# Patient Record
Sex: FEMALE | Race: ASIAN | NOT HISPANIC OR LATINO | Employment: OTHER | URBAN - METROPOLITAN AREA
[De-identification: names, ages, dates, MRNs, and addresses within clinical notes are randomized per-mention and may not be internally consistent; named-entity substitution may affect disease eponyms.]

---

## 2017-02-03 ENCOUNTER — OFFICE VISIT (OUTPATIENT)
Dept: MEDICAL GROUP | Facility: CLINIC | Age: 77
End: 2017-02-03
Payer: MEDICARE

## 2017-02-03 VITALS
HEART RATE: 90 BPM | BODY MASS INDEX: 23.39 KG/M2 | SYSTOLIC BLOOD PRESSURE: 128 MMHG | HEIGHT: 59 IN | WEIGHT: 116 LBS | DIASTOLIC BLOOD PRESSURE: 62 MMHG | TEMPERATURE: 97.9 F | OXYGEN SATURATION: 98 %

## 2017-02-03 DIAGNOSIS — R94.30 NONSPECIFIC ABNORMAL RESULTS OF CARDIOVASCULAR FUNCTION STUDY: Chronic | ICD-10-CM

## 2017-02-03 DIAGNOSIS — R94.31 ABNORMAL ELECTROCARDIOGRAM: Chronic | ICD-10-CM

## 2017-02-03 DIAGNOSIS — I10 ESSENTIAL HYPERTENSION: Chronic | ICD-10-CM

## 2017-02-03 DIAGNOSIS — R01.1 MURMUR: Chronic | ICD-10-CM

## 2017-02-03 DIAGNOSIS — M79.672 PAIN IN BOTH FEET: Chronic | ICD-10-CM

## 2017-02-03 DIAGNOSIS — Z82.49 FAMILY HISTORY OF CORONARY ARTERY DISEASE: ICD-10-CM

## 2017-02-03 DIAGNOSIS — Z00.00 MEDICARE ANNUAL WELLNESS VISIT, INITIAL: Primary | ICD-10-CM

## 2017-02-03 DIAGNOSIS — Z85.3 HISTORY OF BREAST CANCER: Chronic | ICD-10-CM

## 2017-02-03 DIAGNOSIS — M79.671 PAIN IN BOTH FEET: Chronic | ICD-10-CM

## 2017-02-03 PROCEDURE — G8510 SCR DEP NEG, NO PLAN REQD: HCPCS | Performed by: NURSE PRACTITIONER

## 2017-02-03 PROCEDURE — G0438 PPPS, INITIAL VISIT: HCPCS | Performed by: NURSE PRACTITIONER

## 2017-02-03 PROCEDURE — 1036F TOBACCO NON-USER: CPT | Performed by: NURSE PRACTITIONER

## 2017-02-03 ASSESSMENT — PATIENT HEALTH QUESTIONNAIRE - PHQ9: CLINICAL INTERPRETATION OF PHQ2 SCORE: 1

## 2017-02-03 NOTE — ASSESSMENT & PLAN NOTE
Followed by cardiology q 6 months  Denies cardiac sxs  Has yearly echo  Continue with current medications

## 2017-02-03 NOTE — ASSESSMENT & PLAN NOTE
Occurrence ~2008, lumpectomy, no node involvement with radiation for ? 6 months  Has yearly mammo in late March at [facility] [place], neg to date  No recurrence

## 2017-02-03 NOTE — ASSESSMENT & PLAN NOTE
Followed by cardiology q 6 months  Denies cardiac sxs  States yearly has echo  Continue with current medications

## 2017-02-03 NOTE — ASSESSMENT & PLAN NOTE
"Per pt edgar \"top of foot \" pain which has improved with exercises through PT  More pronounced discomfort if sitting for longer period and gets up to walk  Followed by Luz Marina Figueroa M.D..   "

## 2017-02-03 NOTE — ASSESSMENT & PLAN NOTE
Followed by cardiology q 6 months  Denies cardiac sxs  States has yearly echo  Continue with current medications

## 2017-02-03 NOTE — ASSESSMENT & PLAN NOTE
Chronic condition managed with current medical regimen  Stable per review   Continue with current meds  Followed by Luz Marina Figueroa M.D..

## 2017-02-03 NOTE — MR AVS SNAPSHOT
"Leah Cortez   2/3/2017 10:00 AM   Office Visit   MRN: [id number]    Department:  [facility]   Dept Phone:  [phone]    Description:  Female : 1940   Provider:  ANDRE Bill           Reason for Visit     Annual Exam initial      Allergies as of 2/3/2017     No Known Allergies      You were diagnosed with     Medicare annual wellness visit, initial   [124735]  -  Primary     Essential hypertension   [3041809]       Nonspecific abnormal results of cardiovascular function study   [3293187]       Murmur   [707846]       History of breast cancer   [239610]       Abnormal electrocardiogram   [621749]       Pain in both feet   [776569]       Family history of coronary artery disease   [091960]         Vital Signs     Blood Pressure Pulse Temperature Height Weight Body Mass Index    128/62 mmHg 90 36.6 °C (97.9 °F) 1.499 m (4' 11.02\") 52.617 kg (116 lb) 23.42 kg/m2    Oxygen Saturation Smoking Status                98% Never Smoker           Basic Information     Date Of Birth Sex Race Ethnicity Preferred Language    1940 Female  Non- English      Your appointments     2017  8:20 AM   Established Patient with Luz Marina Figueroa M.D.   [facility] - Internal Medicine (--)    [address] [place] [id number] [phone]           You will be receiving a confirmation call a few days before your appointment from our automated call confirmation system.              Problem List              ICD-10-CM Priority Class Noted - Resolved    Abnormal electrocardiogram (Chronic) R94.31   2010 - Present    Nonspecific abnormal results of cardiovascular function study (Chronic) R94.30   2009 - Present    Edema (Chronic) R60.9 Medium  2011 - Present    Foot pain (Chronic) M79.673   2009 - Present    Hyperlipidemia (Chronic) E78.5 Medium  2011 - Present    HTN (hypertension) (Chronic) I10 Medium  2011 - Present   " Murmur (Chronic) R01.1   9/21/2011 - Present    History of breast cancer (Chronic) Z85.3   4/8/2009 - Present    Family history of coronary artery disease Z82.49 Medium  4/5/2012 - Present    Vitamin d deficiency    10/18/2012 - Present      Health Maintenance        Date Due Completion Dates    IMM DTaP/Tdap/Td Vaccine (1 - Tdap) 1/9/1959 ---    MAMMOGRAM 1/9/1980 ---    IMM ZOSTER VACCINE 1/9/2000 ---    COLONOSCOPY 8/4/2017 (Originally 1/9/1990) ---    IMM PNEUMOCOCCAL 65+ (ADULT) LOW/MEDIUM RISK SERIES (2 of 2 - PPSV23) 10/26/2017 10/26/2016    BONE DENSITY 1/19/2020 1/19/2015, 4/4/2008            Current Immunizations     13-VALENT PCV PREVNAR 10/26/2016    Influenza Vaccine Adult HD 10/26/2016    Influenza Vaccine Quad Inj (Pf) 10/23/2015      Below and/or attached are the medications your provider expects you to take. Review all of your home medications and newly ordered medications with your provider and/or pharmacist. Follow medication instructions as directed by your provider and/or pharmacist. Please keep your medication list with you and share with your provider. Update the information when medications are discontinued, doses are changed, or new medications (including over-the-counter products) are added; and carry medication information at all times in the event of emergency situations     Allergies:  No Known Allergies          Medications  Valid as of: February 03, 2017 - 11:08 AM    Generic Name Brand Name Tablet Size Instructions for use    AmLODIPine Besylate (Tab) NORVASC 10 MG Take 10 mg by mouth every day.        Aspirin (Tab) aspirin 81 MG Take  by mouth every day.        Calcium Citrate-Vitamin D   Take  by mouth every day.        Cetirizine HCl (Tab) ZYRTEC 10 MG Take 10 mg by mouth every day.        Cholecalciferol (Tab) vitamin D 2000 UNIT Take  by mouth. Take 1 tablet daily.         Fish Oil   by Does not apply route.          Fluticasone Propionate (Suspension) FLONASE 50 MCG/ACT Spray 2  Sprays in nose every day.        Lisinopril-Hydrochlorothiazide (Tab) PRINZIDE, ZESTORETIC 20-12.5 MG Take 1 Tab by mouth every day.        Rosuvastatin Calcium (Tab) CRESTOR 10 MG Take 1 Tab by mouth every evening.        .                 Medicines prescribed today were sent to:     [id number] - [place], [place] - [address] [place] [address]    [address] [facility]. [place] [address]    Phone: [phone] Fax: [phone]    Open 24 Hours?: No      Medication refill instructions:       If your prescription bottle indicates you have medication refills left, it is not necessary to call your provider’s office. Please contact your pharmacy and they will refill your medication.    If your prescription bottle indicates you do not have any refills left, you may request refills at any time through one of the following ways: The online Tray system (except Urgent Care), by calling your provider’s office, or by asking your pharmacy to contact your provider’s office with a refill request. Medication refills are processed only during regular business hours and may not be available until the next business day. Your provider may request additional information or to have a follow-up visit with you prior to refilling your medication.   *Please Note: Medication refills are assigned a new Rx number when refilled electronically. Your pharmacy may indicate that no refills were authorized even though a new prescription for the same medication is available at the pharmacy. Please request the medicine by name with the pharmacy before contacting your provider for a refill.        Instructions    Continue with care through Luz Marina Figueroa M.D..  Next Medicare Annual Wellness Visit is due in one year.    Continue care with specialists you are seeing for your chronic problems.    Attached is some preventative information for you to read.          Fall Prevention and Home Safety  Falls cause injuries and can affect all age groups. It is possible to  prevent falls.   HOW TO PREVENT FALLS  · Wear shoes with rubber soles that do not have an opening for your toes.   · Keep the inside and outside of your house well lit.   · Use night lights throughout your home.   · Remove clutter from floors.   · Clean up floor spills.   · Remove throw rugs or fasten them to the floor with carpet tape.   · Do not place electrical cords across pathways.   · Put grab bars by your tub, shower, and toilet. Do not use towel bars as grab bars.   · Put handrails on both sides of the stairway. Fix loose handrails.   · Do not climb on stools or stepladders, if possible.   · Do not wax your floors.   · Repair uneven or unsafe sidewalks, walkways, or stairs.   · Keep items you use a lot within reach.   · Be aware of pets.   · Keep emergency numbers next to the telephone.   · Put smoke detectors in your home and near bedrooms.   Ask your doctor what other things you can do to prevent falls.  Document Released: 10/14/2010 Document Revised: 06/18/2013 Document Reviewed: 03/19/2013  ExitCare® Patient Information ©2013 [employer].    Exercise to Stay Healthy      Exercise helps you become and stay healthy.  EXERCISE IDEAS AND TIPS  Choose exercises that:  · You enjoy.   · Fit into your day.   You do not need to exercise really hard to be healthy. You can do exercises at a slow or medium level and stay healthy. You can:  · Stretch before and after working out.   · Try yoga, Pilates, or kimi chi.   · Lift weights.   · Walk fast, swim, jog, run, climb stairs, bicycle, dance, or rollerskate.   · Take aerobic classes.   Exercises that burn about 150 calories:  · Running 1 ½ miles in 15 minutes.   · Playing volleyball for 45 to 60 minutes.   · Washing and waxing a car for 45 to 60 minutes.   · Playing touch football for 45 minutes.   · Walking 1 ¾ miles in 35 minutes.   · Pushing a stroller 1 ½ miles in 30 minutes.   · Playing basketball for 30 minutes.   · Raking leaves for 30 minutes.   · Bicycling  5 miles in 30 minutes.   · Walking 2 miles in 30 minutes.   · Dancing for 30 minutes.   · Shoveling snow for 15 minutes.   · Swimming laps for 20 minutes.   · Walking up stairs for 15 minutes.   · Bicycling 4 miles in 15 minutes.   · Gardening for 30 to 45 minutes.   · Jumping rope for 15 minutes.   · Washing windows or floors for 45 to 60 minutes.     One suggestion is to start walking for 10 minutes after each meal.  This will help with digestion and help you to metabolize your meal.       For Weight Loss -   Recommend portion sizes with more fruits/vegetables/high fiber foods.  Stay away from white bread/pastas/white rice/white potatoes.  Increase Fluid intake to 6-8 glasses of water daily.   Eliminate soda's (diet and regular) from your fluid intake.      Document Released: 01/20/2012 Document Revised: 03/11/2013 Document Reviewed: 01/20/2012  ExitCare® Patient Information ©2013 [email or web address], [employer].    Fat and Cholesterol Control Diet  Cholesterol is a wax-like substance. It comes from your liver and is found in certain foods. There is good (HDL) and bad (LDL) cholesterol. Too much cholesterol in your blood can affect your heart. Certain foods can lower or raise your cholesterol. Eat foods that are low in cholesterol.  Saturated and trans fats are bad fats found in foods that will raise your cholesterol. Do not eat foods that are high in saturated and trans fats.  FOODS HIGHER IN SATURATED AND TRANS FATS  · Dairy products, such as whole milk, eggs, cheese, cream, and butter.   · Fatty meats, such as hot dogs, sausage, and salami.   · Fried foods.   · Trans fats which are found in margarine and pre-made cookies, crackers, and baked goods.   · Tropical oils, such as coconut and palm oils.   Read package labels at the store. Do not buy products that use saturated or trans fats or hydrogenated oils. Find foods labeled:  · Low-fat.   · Low-saturated fat.   · Trans-fat-free.   · Low-cholesterol.   FOODS LOWER IN  CHOLESTEROL  ·  Fruit.   · Vegetables.   · Beans, peas, and lentils.   · Fish.   · Lean meat, such as chicken (without skin) or ground turkey.   · Grains, such as barley, rice, couscous, bulgur wheat, and pasta.   · Heart-healthy tub margarine.   PREPARING YOUR FOOD  · Broil, bake, steam, or roast foods. Do not abdullahi food.   · Use non-stick cooking sprays.   · Use lemon or herbs to flavor food instead of using butter or stick margarine.   · Use nonfat yogurt, salsa, or low-fat dressings for salads.   Document Released: 06/18/2013 Document Reviewed: 06/18/2013  ExitCare® Patient Information ©2013 [employer], [employer].    Recommend annual flu vaccine.  Next due in Fall, 2015.  If you decide not to have the flu vaccine, recommend good handwashing and staying out of crowds during flu season.                  MyChart Status: Patient Declined

## 2017-02-03 NOTE — ASSESSMENT & PLAN NOTE
Per pt occurs intermit with prolonged standing, resolves with elevation of LE and application of FABY hose   Continue with current meds   Followed by Luz Marina Figueroa M.D..

## 2017-02-03 NOTE — PROGRESS NOTES
CC:   Medicare Annual Wellness Visit    HPI:  Leah is a 77 y.o. here for Medicare Annual Wellness Visit    Patient Active Problem List    Diagnosis Date Noted   • Family history of coronary artery disease 04/05/2012     Priority: Medium   • Edema 09/21/2011     Priority: Medium   • Hyperlipidemia 09/21/2011     Priority: Medium   • HTN (hypertension) 09/21/2011     Priority: Medium   • Vitamin d deficiency 10/18/2012   • Murmur 09/21/2011   • Abnormal electrocardiogram 05/24/2010   • Nonspecific abnormal results of cardiovascular function study 04/30/2009   • Foot pain 04/08/2009   • History of breast cancer 04/08/2009     Current Outpatient Prescriptions   Medication Sig Dispense Refill   • lisinopril-hydrochlorothiazide (PRINZIDE, ZESTORETIC) 20-12.5 MG per tablet Take 1 Tab by mouth every day. 90 Tab 3   • rosuvastatin (CRESTOR) 10 MG Tab Take 1 Tab by mouth every evening. 90 Tab 2   • fluticasone (FLONASE) 50 MCG/ACT nasal spray Spray 2 Sprays in nose every day. 2 Bottle 3   • cetirizine (ZYRTEC) 10 MG Tab Take 10 mg by mouth every day.     • FISH OIL by Does not apply route.       • amlodipine (NORVASC) 10 MG TABS Take 10 mg by mouth every day.     • aspirin 81 MG tablet Take  by mouth every day.     • Calcium Carbonate-Vitamin D (CALCIUM + D PO) Take  by mouth every day.     • Cholecalciferol (VITAMIN D) 2000 UNIT TABS Take  by mouth. Take 1 tablet daily.        No current facility-administered medications for this visit.      Current supplements: no  Chronic narcotic pain medicines: no  Allergies: Review of patient's allergies indicates no known allergies.  Exercise: as gordon  Current social contact/activities: Has support of family and friends      Screening:  Depression Screening    Little interest or pleasure in doing things?     Feeling down, depressed , or hopeless?    Trouble falling or staying asleep, or sleeping too much?     Feeling tired or having little energy?     Poor appetite or overeating?      Feeling bad about yourself - or that you are a failure or have let yourself or your family down?    Trouble concentrating on things, such as reading the newspaper or watching television?    Moving or speaking so slowly that other people could have noticed.  Or the opposite - being so fidgety or restless that you have been moving around a lot more than usual?     Thoughts that you would be better off dead, or of hurting yourself?     Patient Health Questionnaire Score:      If depressive symptoms identified deferred to follow up visit unless specifically addressed in assesment and plan.      Screening for Cognitive Impairment    Three Minute Recall (banana, sunrise, fence)   /3    Draw clock face with all 12 numbers set to the hand to show 10 minures past 11 o'clock       Cognitive concerns identified defferred for follow up unless specifically addressed in assesment and plan.    Fall Risk Assessment    Has the patient had two or more falls in the last year or any fall with injury in the last year?       Safety Assessment    Throw rugs on floor.     Handrails on all stairs.     Good lighting in all hallways.     Difficulty hearing.     Patient counseled about all safety risks that were identified.    Functional Assessment ADLs    Are there any barriers preventing you from cooking for yourself or meeting nutritional needs?   .    Are there any barriers preventing you from driving safely or obtaining transportation?   .    Are there any barriers preventing you from using a telephone or calling for help?   .    Are there any barriers preventing you from shopping?   .    Are there any barriers preventing you from taking care of your own finances?   .    Are there any barriers preventing you from managing your medications?   .    Are currently engaing any exercise or physical activity?   .       Health Maintenance Summary                Annual Wellness Visit Overdue 1940     IMM DTaP/Tdap/Td Vaccine Overdue 1/9/1959  "    PAP SMEAR Overdue 1/9/1961     MAMMOGRAM Overdue 1/9/1980     COLONOSCOPY Overdue 1/9/1990     IMM ZOSTER VACCINE Overdue 1/9/2000     IMM PNEUMOCOCCAL 65+ (ADULT) LOW/MEDIUM RISK SERIES Next Due 10/26/2017      Done 10/26/2016 Imm Admin: Pneumococcal Conjugate Vaccine (Prevnar/PCV-13)    BONE DENSITY Next Due 1/19/2020      Done 1/19/2015 DS-BONE DENSITY STUDY (DEXA)     Patient has more history with this topic...          Patient Care Team:  Luz Marina Figueroa M.D. as PCP - General  Kamilla Bains M.D. as Consulting Physician (Cardiology)        Social History   Substance Use Topics   • Smoking status: Never Smoker    • Smokeless tobacco: Never Used   • Alcohol Use: No       Family History   Problem Relation Age of Onset   • Stroke Father    • Breast Cancer Sister      X 2   • Arthritis Mother      She  has a past medical history of Abnormal electrocardiogram (5/24/2010); Nonspecific abnormal unspecified cardiovascular function study (4/30/2009); Edema (9/21/2011); Foot pain (4/8/2009); Hyperlipidemia (9/21/2011); HTN (hypertension) (9/21/2011); Murmur (9/21/2011); History of breast cancer (4/8/2009); Family history of coronary artery disease (4/5/2012); Vitamin d deficiency (10/18/2012); DDD (degenerative disc disease), cervical; Insomnia; Vitiligo; Osteoporosis; and Dyslipidemia.   Past Surgical History   Procedure Laterality Date   • Other       BREAST SURGERY LUMPECTOMY.   • Hysterectomy, total abdominal     • Cyst excision       LUNG   • Other       PACEMAKER       ROS:    Ostomy or other tubes or amputations: no  Chronic oxygen use no  Last eye exam 2016  : Denies incontinence.   Gait: Uses no assistive device   Hearing good.    Dentition good    Exam: Blood pressure 128/62, pulse 90, temperature 36.6 °C (97.9 °F), height 1.499 m (4' 11.02\"), weight 52.617 kg (116 lb), SpO2 98 %. Body mass index is 23.42 kg/(m^2).  Alert, oriented in no acute distress.  Eye contact is good, speech goal directed, affect " "calm      Assessment and Plan. The following treatment and monitoring plan is recommended for all problems as listed below:   Hyperlipidemia  Chronic condition managed with current medical regimen  Stable per review   Continue with current meds  Followed by Luz Marina Figueroa M.D..        HTN (hypertension)  /62  Chronic condition managed with current medical regimen  Stable per review   Continue with current meds  Followed by Luz Marina Figueroa M.D..        Nonspecific abnormal results of cardiovascular function study  Followed by cardiology q 6 months  Denies cardiac sxs  States yearly has echo  Continue with current medications     Murmur  Followed by cardiology q 6 months  Denies cardiac sxs  Has yearly echo  Continue with current medications     History of breast cancer  Occurrence ~2008, lumpectomy, no node involvement with radiation for ? 6 months  Has yearly mamm[facility] in late March at [facility], neg to date  No recurrence    Vitamin d deficiency  Chronic condition managed with current medical regimen  Stable per review   Continue with current meds  Followed by Luz Marina Figueroa M.D..        Edema  Per pt occurs intermit with prolonged standing, resolves with elevation of LE and application of FABY hose   Continue with current meds   Followed by Luz Marina Figueroa M.D..     Abnormal electrocardiogram  Followed by cardiology q 6 months  Denies cardiac sxs  States has yearly echo  Continue with current medications     Foot pain  Per pt edgar \"top of foot \" pain which has improved with exercises through PT  More pronounced discomfort if sitting for longer period and gets up to walk  Followed by Luz Marina Figueroa M.D..     Family history of coronary artery disease  Followed by cardiology q 6 months  Denies cardiac sxs  Has yearly echo  Continue with current medications         Health Care Screening recommendations reviewed with patient today: per Patient Instructions  DPA/Advanced directive: .has NO advanced directive - not " interested in additional information    Next office visit for recheck of chronic medical conditions is due with Luz Marina Figueroa M.D.4/19/2017        Per pt mammo done yearly at end of march at [facility] [place]

## 2017-02-03 NOTE — PATIENT INSTRUCTIONS
Continue with care through Luz Marina Figueroa M.D..  Next Medicare Annual Wellness Visit is due in one year.    Continue care with specialists you are seeing for your chronic problems.    Attached is some preventative information for you to read.          Fall Prevention and Home Safety  Falls cause injuries and can affect all age groups. It is possible to prevent falls.   HOW TO PREVENT FALLS  · Wear shoes with rubber soles that do not have an opening for your toes.   · Keep the inside and outside of your house well lit.   · Use night lights throughout your home.   · Remove clutter from floors.   · Clean up floor spills.   · Remove throw rugs or fasten them to the floor with carpet tape.   · Do not place electrical cords across pathways.   · Put grab bars by your tub, shower, and toilet. Do not use towel bars as grab bars.   · Put handrails on both sides of the stairway. Fix loose handrails.   · Do not climb on stools or stepladders, if possible.   · Do not wax your floors.   · Repair uneven or unsafe sidewalks, walkways, or stairs.   · Keep items you use a lot within reach.   · Be aware of pets.   · Keep emergency numbers next to the telephone.   · Put smoke detectors in your home and near bedrooms.   Ask your doctor what other things you can do to prevent falls.  Document Released: 10/14/2010 Document Revised: 06/18/2013 Document Reviewed: 03/19/2013  ExitCare® Patient Information ©2013 [employer].    Exercise to Stay Healthy      Exercise helps you become and stay healthy.  EXERCISE IDEAS AND TIPS  Choose exercises that:  · You enjoy.   · Fit into your day.   You do not need to exercise really hard to be healthy. You can do exercises at a slow or medium level and stay healthy. You can:  · Stretch before and after working out.   · Try yoga, Pilates, or kimi chi.   · Lift weights.   · Walk fast, swim, jog, run, climb stairs, bicycle, dance, or rollerskate.   · Take aerobic classes.   Exercises that burn about 150  calories:  · Running 1 ½ miles in 15 minutes.   · Playing volleyball for 45 to 60 minutes.   · Washing and waxing a car for 45 to 60 minutes.   · Playing touch football for 45 minutes.   · Walking 1 ¾ miles in 35 minutes.   · Pushing a stroller 1 ½ miles in 30 minutes.   · Playing basketball for 30 minutes.   · Raking leaves for 30 minutes.   · Bicycling 5 miles in 30 minutes.   · Walking 2 miles in 30 minutes.   · Dancing for 30 minutes.   · Shoveling snow for 15 minutes.   · Swimming laps for 20 minutes.   · Walking up stairs for 15 minutes.   · Bicycling 4 miles in 15 minutes.   · Gardening for 30 to 45 minutes.   · Jumping rope for 15 minutes.   · Washing windows or floors for 45 to 60 minutes.     One suggestion is to start walking for 10 minutes after each meal.  This will help with digestion and help you to metabolize your meal.       For Weight Loss -   Recommend portion sizes with more fruits/vegetables/high fiber foods.  Stay away from white bread/pastas/white rice/white potatoes.  Increase Fluid intake to 6-8 glasses of water daily.   Eliminate soda's (diet and regular) from your fluid intake.      Document Released: 01/20/2012 Document Revised: 03/11/2013 Document Reviewed: 01/20/2012  ExitCare® Patient Information ©2013 [employer].    Fat and Cholesterol Control Diet  Cholesterol is a wax-like substance. It comes from your liver and is found in certain foods. There is good (HDL) and bad (LDL) cholesterol. Too much cholesterol in your blood can affect your heart. Certain foods can lower or raise your cholesterol. Eat foods that are low in cholesterol.  Saturated and trans fats are bad fats found in foods that will raise your cholesterol. Do not eat foods that are high in saturated and trans fats.  FOODS HIGHER IN SATURATED AND TRANS FATS  · Dairy products, such as whole milk, eggs, cheese, cream, and butter.   · Fatty meats, such as hot dogs, sausage, and salami.   · Fried foods.   · Trans fats which  are found in margarine and pre-made cookies, crackers, and baked goods.   · Tropical oils, such as coconut and palm oils.   Read package labels at the store. Do not buy products that use saturated or trans fats or hydrogenated oils. Find foods labeled:  · Low-fat.   · Low-saturated fat.   · Trans-fat-free.   · Low-cholesterol.   FOODS LOWER IN CHOLESTEROL  ·  Fruit.   · Vegetables.   · Beans, peas, and lentils.   · Fish.   · Lean meat, such as chicken (without skin) or ground turkey.   · Grains, such as barley, rice, couscous, bulgur wheat, and pasta.   · Heart-healthy tub margarine.   PREPARING YOUR FOOD  · Broil, bake, steam, or roast foods. Do not abdullahi food.   · Use non-stick cooking sprays.   · Use lemon or herbs to flavor food instead of using butter or stick margarine.   · Use nonfat yogurt, salsa, or low-fat dressings for salads.   Document Released: 06/18/2013 Document Reviewed: 06/18/2013  ExitCare® Patient Information ©2013 [employer].    Recommend annual flu vaccine.  Next due in Fall, 2015.  If you decide not to have the flu vaccine, recommend good handwashing and staying out of crowds during flu season.

## 2017-02-26 ENCOUNTER — HOSPITAL ENCOUNTER (OUTPATIENT)
Dept: RADIOLOGY | Facility: MEDICAL CENTER | Age: 77
End: 2017-02-26
Attending: NURSE PRACTITIONER
Payer: MEDICARE

## 2017-02-26 DIAGNOSIS — R05.9 COUGH: ICD-10-CM

## 2017-02-26 PROCEDURE — 71020 DX-CHEST-2 VIEWS: CPT

## 2017-04-03 ENCOUNTER — HOSPITAL ENCOUNTER (OUTPATIENT)
Dept: HOSPITAL 8 - CFH | Age: 77
Discharge: HOME | End: 2017-04-03
Attending: INTERNAL MEDICINE
Payer: MEDICARE

## 2017-04-03 DIAGNOSIS — Z12.31: Primary | ICD-10-CM

## 2017-04-03 DIAGNOSIS — Z95.0: ICD-10-CM

## 2017-04-03 DIAGNOSIS — Z92.3: ICD-10-CM

## 2017-04-03 DIAGNOSIS — Z98.890: ICD-10-CM

## 2017-04-03 DIAGNOSIS — Z85.3: ICD-10-CM

## 2017-04-03 PROCEDURE — 77063 BREAST TOMOSYNTHESIS BI: CPT

## 2017-04-03 PROCEDURE — G0202 SCR MAMMO BI INCL CAD: HCPCS

## 2017-04-20 ENCOUNTER — HOSPITAL ENCOUNTER (OUTPATIENT)
Dept: LAB | Facility: MEDICAL CENTER | Age: 77
End: 2017-04-20
Attending: INTERNAL MEDICINE
Payer: MEDICARE

## 2017-04-20 DIAGNOSIS — E78.5 HYPERLIPIDEMIA, UNSPECIFIED HYPERLIPIDEMIA TYPE: Chronic | ICD-10-CM

## 2017-04-20 DIAGNOSIS — R73.02 GLUCOSE INTOLERANCE (IMPAIRED GLUCOSE TOLERANCE): ICD-10-CM

## 2017-04-20 DIAGNOSIS — I10 ESSENTIAL HYPERTENSION: Chronic | ICD-10-CM

## 2017-04-20 LAB
EST. AVERAGE GLUCOSE BLD GHB EST-MCNC: 131 MG/DL
HBA1C MFR BLD: 6.2 % (ref 0–5.6)

## 2017-04-20 PROCEDURE — 83036 HEMOGLOBIN GLYCOSYLATED A1C: CPT

## 2017-04-20 PROCEDURE — 36415 COLL VENOUS BLD VENIPUNCTURE: CPT

## 2017-04-21 ENCOUNTER — HOSPITAL ENCOUNTER (OUTPATIENT)
Dept: LAB | Facility: MEDICAL CENTER | Age: 77
End: 2017-04-21
Attending: INTERNAL MEDICINE
Payer: MEDICARE

## 2017-04-21 LAB
ALBUMIN SERPL BCP-MCNC: 4.5 G/DL (ref 3.2–4.9)
ALBUMIN/GLOB SERPL: 1.6 G/DL
ALP SERPL-CCNC: 55 U/L (ref 30–99)
ALT SERPL-CCNC: 14 U/L (ref 2–50)
ANION GAP SERPL CALC-SCNC: 9 MMOL/L (ref 0–11.9)
AST SERPL-CCNC: 23 U/L (ref 12–45)
BILIRUB SERPL-MCNC: 0.7 MG/DL (ref 0.1–1.5)
BUN SERPL-MCNC: 16 MG/DL (ref 8–22)
CALCIUM SERPL-MCNC: 10 MG/DL (ref 8.5–10.5)
CHLORIDE SERPL-SCNC: 103 MMOL/L (ref 96–112)
CHOLEST SERPL-MCNC: 168 MG/DL (ref 100–199)
CO2 SERPL-SCNC: 26 MMOL/L (ref 20–33)
CREAT SERPL-MCNC: 0.77 MG/DL (ref 0.5–1.4)
GFR SERPL CREATININE-BSD FRML MDRD: >60 ML/MIN/1.73 M 2
GLOBULIN SER CALC-MCNC: 2.9 G/DL (ref 1.9–3.5)
GLUCOSE SERPL-MCNC: 95 MG/DL (ref 65–99)
HDLC SERPL-MCNC: 72 MG/DL
LDLC SERPL CALC-MCNC: 80 MG/DL
POTASSIUM SERPL-SCNC: 3.7 MMOL/L (ref 3.6–5.5)
PROT SERPL-MCNC: 7.4 G/DL (ref 6–8.2)
SODIUM SERPL-SCNC: 138 MMOL/L (ref 135–145)
TRIGL SERPL-MCNC: 78 MG/DL (ref 0–149)

## 2017-04-21 PROCEDURE — 80053 COMPREHEN METABOLIC PANEL: CPT

## 2017-04-21 PROCEDURE — 80061 LIPID PANEL: CPT

## 2017-04-26 ENCOUNTER — OFFICE VISIT (OUTPATIENT)
Dept: INTERNAL MEDICINE | Facility: MEDICAL CENTER | Age: 77
End: 2017-04-26
Payer: MEDICARE

## 2017-04-26 VITALS
HEIGHT: 59 IN | DIASTOLIC BLOOD PRESSURE: 66 MMHG | SYSTOLIC BLOOD PRESSURE: 113 MMHG | WEIGHT: 114 LBS | BODY MASS INDEX: 22.98 KG/M2 | OXYGEN SATURATION: 98 % | RESPIRATION RATE: 16 BRPM | HEART RATE: 84 BPM | TEMPERATURE: 98.1 F

## 2017-04-26 DIAGNOSIS — F51.04 PSYCHOPHYSIOLOGICAL INSOMNIA: ICD-10-CM

## 2017-04-26 DIAGNOSIS — R73.02 GLUCOSE INTOLERANCE (IMPAIRED GLUCOSE TOLERANCE): ICD-10-CM

## 2017-04-26 DIAGNOSIS — I10 ESSENTIAL HYPERTENSION: ICD-10-CM

## 2017-04-26 DIAGNOSIS — Z85.3 HISTORY OF BREAST CANCER: Chronic | ICD-10-CM

## 2017-04-26 DIAGNOSIS — E78.2 MIXED HYPERLIPIDEMIA: Chronic | ICD-10-CM

## 2017-04-26 PROCEDURE — 1036F TOBACCO NON-USER: CPT | Performed by: INTERNAL MEDICINE

## 2017-04-26 PROCEDURE — 1101F PT FALLS ASSESS-DOCD LE1/YR: CPT | Performed by: INTERNAL MEDICINE

## 2017-04-26 PROCEDURE — 4040F PNEUMOC VAC/ADMIN/RCVD: CPT | Performed by: INTERNAL MEDICINE

## 2017-04-26 PROCEDURE — 99214 OFFICE O/P EST MOD 30 MIN: CPT | Performed by: INTERNAL MEDICINE

## 2017-04-26 PROCEDURE — G8432 DEP SCR NOT DOC, RNG: HCPCS | Performed by: INTERNAL MEDICINE

## 2017-04-26 PROCEDURE — G8420 CALC BMI NORM PARAMETERS: HCPCS | Performed by: INTERNAL MEDICINE

## 2017-04-26 RX ORDER — ROSUVASTATIN CALCIUM 10 MG/1
10 TABLET, COATED ORAL EVERY EVENING
Qty: 90 TAB | Refills: 2 | Status: SHIPPED | OUTPATIENT
Start: 2017-04-26 | End: 2018-01-25 | Stop reason: SDUPTHER

## 2017-04-26 NOTE — PATIENT INSTRUCTIONS
Pls get TDap from pharmacy  Insomnia  Insomnia is a sleep disorder that makes it difficult to fall asleep or to stay asleep. Insomnia can cause tiredness (fatigue), low energy, difficulty concentrating, mood swings, and poor performance at work or school.   There are three different ways to classify insomnia:   · Difficulty falling asleep.  · Difficulty staying asleep.  · Waking up too early in the morning.  Any type of insomnia can be long-term (chronic) or short-term (acute). Both are common. Short-term insomnia usually lasts for three months or less. Chronic insomnia occurs at least three times a week for longer than three months.  CAUSES   Insomnia may be caused by another condition, situation, or substance, such as:  · Anxiety.  · Certain medicines.  · Gastroesophageal reflux disease (GERD) or other gastrointestinal conditions.  · Asthma or other breathing conditions.  · Restless legs syndrome, sleep apnea, or other sleep disorders.  · Chronic pain.  · Menopause. This may include hot flashes.  · Stroke.  · Abuse of alcohol, tobacco, or illegal drugs.  · Depression.  · Caffeine.    · Neurological disorders, such as Alzheimer disease.  · An overactive thyroid (hyperthyroidism).  The cause of insomnia may not be known.  RISK FACTORS  Risk factors for insomnia include:  · Gender. Women are more commonly affected than men.  · Age. Insomnia is more common as you get older.  · Stress. This may involve your professional or personal life.  · Income. Insomnia is more common in people with lower income.  · Lack of exercise.    · Irregular work schedule or night shifts.  · Travelling between different time zones.  SIGNS AND SYMPTOMS  If you have insomnia, trouble falling asleep or trouble staying asleep is the main symptom. This may lead to other symptoms, such as:  · Feeling fatigued.  · Feeling nervous about going to sleep.  · Not feeling rested in the morning.  · Having trouble concentrating.  · Feeling irritable,  anxious, or depressed.  TREATMENT   Treatment for insomnia depends on the cause. If your insomnia is caused by an underlying condition, treatment will focus on addressing the condition. Treatment may also include:   · Medicines to help you sleep.  · Counseling or therapy.  · Lifestyle adjustments.  HOME CARE INSTRUCTIONS   · Take medicines only as directed by your health care provider.  · Keep regular sleeping and waking hours. Avoid naps.  · Keep a sleep diary to help you and your health care provider figure out what could be causing your insomnia. Include:    ¨ When you sleep.  ¨ When you wake up during the night.  ¨ How well you sleep.    ¨ How rested you feel the next day.  ¨ Any side effects of medicines you are taking.  ¨ What you eat and drink.    · Make your bedroom a comfortable place where it is easy to fall asleep:  ¨ Put up shades or special blackout curtains to block light from outside.  ¨ Use a white noise machine to block noise.  ¨ Keep the temperature cool.    · Exercise regularly as directed by your health care provider. Avoid exercising right before bedtime.  · Use relaxation techniques to manage stress. Ask your health care provider to suggest some techniques that may work well for you. These may include:  ¨ Breathing exercises.  ¨ Routines to release muscle tension.  ¨ Visualizing peaceful scenes.  · Cut back on alcohol, caffeinated beverages, and cigarettes, especially close to bedtime. These can disrupt your sleep.  · Do not overeat or eat spicy foods right before bedtime. This can lead to digestive discomfort that can make it hard for you to sleep.  · Limit screen use before bedtime. This includes:  ¨ Watching TV.  ¨ Using your smartphone, tablet, and computer.  · Stick to a routine. This can help you fall asleep faster. Try to do a quiet activity, brush your teeth, and go to bed at the same time each night.  · Get out of bed if you are still awake after 15 minutes of trying to sleep. Keep the  lights down, but try reading or doing a quiet activity. When you feel sleepy, go back to bed.  · Make sure that you drive carefully. Avoid driving if you feel very sleepy.  · Keep all follow-up appointments as directed by your health care provider. This is important.  SEEK MEDICAL CARE IF:   · You are tired throughout the day or have trouble in your daily routine due to sleepiness.  · You continue to have sleep problems or your sleep problems get worse.  SEEK IMMEDIATE MEDICAL CARE IF:   · You have serious thoughts about hurting yourself or someone else.     This information is not intended to replace advice given to you by your health care provider. Make sure you discuss any questions you have with your health care provider.     Document Released: 12/15/2001 Document Revised: 01/08/2016 Document Reviewed: 09/18/2015  [employer] Interactive Patient Education ©2016 [employer].

## 2017-04-26 NOTE — MR AVS SNAPSHOT
"Leah Cortez   2017 8:00 AM   Office Visit   MRN: [id number]    Department:  [facility] Med - Internal Med   Dept Phone:  [phone]    Description:  Female : 1940   Provider:  Luz Marina Figueroa M.D.           Reason for Visit     Follow-Up discuss labs      Allergies as of 2017     No Known Allergies      You were diagnosed with     Essential hypertension   [7904092]       Glucose intolerance (impaired glucose tolerance)   [021256]       History of breast cancer   [197464]       Mixed hyperlipidemia   [272.2.ICD-9-CM]       Psychophysiological insomnia   [718313]         Vital Signs     Blood Pressure Pulse Temperature Respirations Height Weight    113/66 mmHg 84 36.7 °C (98.1 °F) 16 1.499 m (4' 11.02\") 51.71 kg (114 lb)    Body Mass Index Oxygen Saturation Smoking Status             23.01 kg/m2 98% Never Smoker          Basic Information     Date Of Birth Sex Race Ethnicity Preferred Language    1940 Female  Non- English      Your appointments     2017  8:00 AM   Established Patient with Luz Marina Figueroa M.D.   [facility] / [facility] Med - Internal Medicine (--)    [address] [place] [address] [id number] [phone]           You will be receiving a confirmation call a few days before your appointment from our automated call confirmation system.              Problem List              ICD-10-CM Priority Class Noted - Resolved    Abnormal electrocardiogram (Chronic) R94.31   2010 - Present    Nonspecific abnormal results of cardiovascular function study (Chronic) R94.30   2009 - Present    Hyperlipidemia (Chronic) E78.5 Medium  2011 - Present    Essential hypertension I10 Medium  2011 - Present    Murmur (Chronic) R01.1   2011 - Present    History of breast cancer (Chronic) Z85.3   2009 - Present    Family history of coronary artery disease Z82.49 Medium  2012 - Present    Vitamin d deficiency    10/18/2012 - Present    Glucose " intolerance (impaired glucose tolerance) R73.02   4/26/2017 - Present      Health Maintenance        Date Due Completion Dates    IMM DTaP/Tdap/Td Vaccine (1 - Tdap) 1/9/1959 ---    IMM ZOSTER VACCINE 1/9/2000 ---    COLONOSCOPY 8/4/2017 (Originally 1/9/1990) ---    IMM PNEUMOCOCCAL 65+ (ADULT) LOW/MEDIUM RISK SERIES (2 of 2 - PPSV23) 10/26/2017 10/26/2016    MAMMOGRAM 4/3/2018 4/3/2017    BONE DENSITY 1/19/2020 1/19/2015, 4/4/2008            Current Immunizations     13-VALENT PCV PREVNAR 10/26/2016    Influenza Vaccine Adult HD 10/26/2016    Influenza Vaccine Quad Inj (Pf) 10/23/2015      Below and/or attached are the medications your provider expects you to take. Review all of your home medications and newly ordered medications with your provider and/or pharmacist. Follow medication instructions as directed by your provider and/or pharmacist. Please keep your medication list with you and share with your provider. Update the information when medications are discontinued, doses are changed, or new medications (including over-the-counter products) are added; and carry medication information at all times in the event of emergency situations     Allergies:  No Known Allergies          Medications  Valid as of: April 26, 2017 -  9:01 AM    Generic Name Brand Name Tablet Size Instructions for use    AmLODIPine Besylate (Tab) NORVASC 10 MG Take 10 mg by mouth every day.        Aspirin (Tab) aspirin 81 MG Take  by mouth every day.        Calcium Citrate-Vitamin D   Take  by mouth every day.        Cetirizine HCl (Tab) ZYRTEC 10 MG Take 10 mg by mouth every day.        Cholecalciferol (Tab) vitamin D 2000 UNIT Take  by mouth. Take 1 tablet daily.         Fish Oil   by Does not apply route.          Fluticasone Propionate (Suspension) FLONASE 50 MCG/ACT Spray 2 Sprays in nose every day.        Lisinopril-Hydrochlorothiazide (Tab) PRINZIDE, ZESTORETIC 20-12.5 MG Take 1 Tab by mouth every day.        Rosuvastatin Calcium  (Tab) CRESTOR 10 MG Take 1 Tab by mouth every evening.        .                 Medicines prescribed today were sent to:     [id number] - [place], [place] - [address] [place] [address]    [address] [place] [address] [place] [address]    Phone: [phone] Fax: [phone]    Open 24 Hours?: No      Medication refill instructions:       If your prescription bottle indicates you have medication refills left, it is not necessary to call your provider’s office. Please contact your pharmacy and they will refill your medication.    If your prescription bottle indicates you do not have any refills left, you may request refills at any time through one of the following ways: The online [employer] system (except Urgent Care), by calling your provider’s office, or by asking your pharmacy to contact your provider’s office with a refill request. Medication refills are processed only during regular business hours and may not be available until the next business day. Your provider may request additional information or to have a follow-up visit with you prior to refilling your medication.   *Please Note: Medication refills are assigned a new Rx number when refilled electronically. Your pharmacy may indicate that no refills were authorized even though a new prescription for the same medication is available at the pharmacy. Please request the medicine by name with the pharmacy before contacting your provider for a refill.        Instructions    Pls get TDap from pharmacy  Insomnia  Insomnia is a sleep disorder that makes it difficult to fall asleep or to stay asleep. Insomnia can cause tiredness (fatigue), low energy, difficulty concentrating, mood swings, and poor performance at work or school.   There are three different ways to classify insomnia:   · Difficulty falling asleep.  · Difficulty staying asleep.  · Waking up too early in the morning.  Any type of insomnia can be long-term (chronic) or short-term (acute). Both are common. Short-term insomnia  usually lasts for three months or less. Chronic insomnia occurs at least three times a week for longer than three months.  CAUSES   Insomnia may be caused by another condition, situation, or substance, such as:  · Anxiety.  · Certain medicines.  · Gastroesophageal reflux disease (GERD) or other gastrointestinal conditions.  · Asthma or other breathing conditions.  · Restless legs syndrome, sleep apnea, or other sleep disorders.  · Chronic pain.  · Menopause. This may include hot flashes.  · Stroke.  · Abuse of alcohol, tobacco, or illegal drugs.  · Depression.  · Caffeine.    · Neurological disorders, such as Alzheimer disease.  · An overactive thyroid (hyperthyroidism).  The cause of insomnia may not be known.  RISK FACTORS  Risk factors for insomnia include:  · Gender. Women are more commonly affected than men.  · Age. Insomnia is more common as you get older.  · Stress. This may involve your professional or personal life.  · Income. Insomnia is more common in people with lower income.  · Lack of exercise.    · Irregular work schedule or night shifts.  · Travelling between different time zones.  SIGNS AND SYMPTOMS  If you have insomnia, trouble falling asleep or trouble staying asleep is the main symptom. This may lead to other symptoms, such as:  · Feeling fatigued.  · Feeling nervous about going to sleep.  · Not feeling rested in the morning.  · Having trouble concentrating.  · Feeling irritable, anxious, or depressed.  TREATMENT   Treatment for insomnia depends on the cause. If your insomnia is caused by an underlying condition, treatment will focus on addressing the condition. Treatment may also include:   · Medicines to help you sleep.  · Counseling or therapy.  · Lifestyle adjustments.  HOME CARE INSTRUCTIONS   · Take medicines only as directed by your health care provider.  · Keep regular sleeping and waking hours. Avoid naps.  · Keep a sleep diary to help you and your health care provider figure out what  could be causing your insomnia. Include:    ¨ When you sleep.  ¨ When you wake up during the night.  ¨ How well you sleep.    ¨ How rested you feel the next day.  ¨ Any side effects of medicines you are taking.  ¨ What you eat and drink.    · Make your bedroom a comfortable place where it is easy to fall asleep:  ¨ Put up shades or special blackout curtains to block light from outside.  ¨ Use a white noise machine to block noise.  ¨ Keep the temperature cool.    · Exercise regularly as directed by your health care provider. Avoid exercising right before bedtime.  · Use relaxation techniques to manage stress. Ask your health care provider to suggest some techniques that may work well for you. These may include:  ¨ Breathing exercises.  ¨ Routines to release muscle tension.  ¨ Visualizing peaceful scenes.  · Cut back on alcohol, caffeinated beverages, and cigarettes, especially close to bedtime. These can disrupt your sleep.  · Do not overeat or eat spicy foods right before bedtime. This can lead to digestive discomfort that can make it hard for you to sleep.  · Limit screen use before bedtime. This includes:  ¨ Watching TV.  ¨ Using your smartphone, tablet, and computer.  · Stick to a routine. This can help you fall asleep faster. Try to do a quiet activity, brush your teeth, and go to bed at the same time each night.  · Get out of bed if you are still awake after 15 minutes of trying to sleep. Keep the lights down, but try reading or doing a quiet activity. When you feel sleepy, go back to bed.  · Make sure that you drive carefully. Avoid driving if you feel very sleepy.  · Keep all follow-up appointments as directed by your health care provider. This is important.  SEEK MEDICAL CARE IF:   · You are tired throughout the day or have trouble in your daily routine due to sleepiness.  · You continue to have sleep problems or your sleep problems get worse.  SEEK IMMEDIATE MEDICAL CARE IF:   · You have serious thoughts  about hurting yourself or someone else.     This information is not intended to replace advice given to you by your health care provider. Make sure you discuss any questions you have with your health care provider.     Document Released: 12/15/2001 Document Revised: 01/08/2016 Document Reviewed: 09/18/2015  [employer] Interactive Patient Education ©2016 [employer].            MyChart Status: Patient Declined

## 2017-04-27 NOTE — PROGRESS NOTES
Chief Complaint   Patient presents with   • Follow-Up     discuss labs       HISTORY OF PRESENT ILLNESS: Patient is a 77 y.o. female established patient who presents today for the following.      1. Essential hypertension  Patient has been taking her blood pressure medication lisinopril hydrochlorothiazide along with amlodipine on daily basis. Denies having any headaches, dizziness, blurry vision.    2. Glucose intolerance (impaired glucose tolerance)  Has been very active with her grandkids and takes good care of her diet and health overall. Denies having any new problems or open wounds or neuropathy in her feet    3. History of breast cancer  Has been doing annual mammograms and regular basis and denies having any new breast lumps.    4. Mixed hyperlipidemia  Taking her cholesterol medication on regular basis without any side effects of myalgias.    5. Psychophysiological insomnia  Recently noticed some sleep issues with the stressors from her [place] and she has trouble falling asleep thinking about them. Has not tried any medications or melatonin at the store      Past Medical History   Diagnosis Date   • Abnormal electrocardiogram 5/24/2010   • Nonspecific abnormal unspecified cardiovascular function study 4/30/2009   • Edema 9/21/2011   • Foot pain 4/8/2009   • Hyperlipidemia 9/21/2011   • HTN (hypertension) 9/21/2011   • Murmur 9/21/2011   • History of breast cancer 4/8/2009   • Family history of coronary artery disease 4/5/2012   • Vitamin d deficiency 10/18/2012   • DDD (degenerative disc disease), cervical    • Insomnia    • Vitiligo    • Osteoporosis    • Dyslipidemia        Patient Active Problem List    Diagnosis Date Noted   • Family history of coronary artery disease 04/05/2012     Priority: Medium   • Hyperlipidemia 09/21/2011     Priority: Medium   • Essential hypertension 09/21/2011     Priority: Medium   • Glucose intolerance (impaired glucose tolerance) 04/26/2017   • Vitamin  "d deficiency 10/18/2012   • Murmur 09/21/2011   • Abnormal electrocardiogram 05/24/2010   • Nonspecific abnormal results of cardiovascular function study 04/30/2009   • History of breast cancer 04/08/2009       Allergies:Review of patient's allergies indicates no known allergies.    Current Outpatient Prescriptions   Medication Sig Dispense Refill   • rosuvastatin (CRESTOR) 10 MG Tab Take 1 Tab by mouth every evening. 90 Tab 2   • lisinopril-hydrochlorothiazide (PRINZIDE, ZESTORETIC) 20-12.5 MG per tablet Take 1 Tab by mouth every day. 90 Tab 3   • fluticasone (FLONASE) 50 MCG/ACT nasal spray Spray 2 Sprays in nose every day. 2 Bottle 3   • cetirizine (ZYRTEC) 10 MG Tab Take 10 mg by mouth every day.     • FISH OIL by Does not apply route.       • amlodipine (NORVASC) 10 MG TABS Take 10 mg by mouth every day.     • aspirin 81 MG tablet Take  by mouth every day.     • Calcium Carbonate-Vitamin D (CALCIUM + D PO) Take  by mouth every day.     • Cholecalciferol (VITAMIN D) 2000 UNIT TABS Take  by mouth. Take 1 tablet daily.        No current facility-administered medications for this visit.       Social History   Substance Use Topics   • Smoking status: Never Smoker    • Smokeless tobacco: Never Used   • Alcohol Use: No       Family History   Problem Relation Age of Onset   • Stroke Father    • Breast Cancer Sister      X 2   • Arthritis Mother          Review of Systems    Patient denies Fevers/chills/nausea/vomiting/chest pain/sob/blood in stools/black stools/blood in urine. See HPI    Exam:  Blood pressure 113/66, pulse 84, temperature 36.7 °C (98.1 °F), resp. rate 16, height 1.499 m (4' 11.02\"), weight 51.71 kg (114 lb), SpO2 98 %. Body mass index is 23.01 kg/(m^2).  Constitutional:  NAD, well appearing.  HEENT:   NC/AT  Cardiovascular: RRR.   No m/r/g. No carotid bruits.       Lungs:   CTAB, no w/r/r, no respiratory distress.  Abdomen: Soft, NT/ND + BS, no masses, no suprapubic tenderness, no " hepatomegaly.  Extremities:  2+ DP and radial pulses bilaterally.  No c/c/e.  Skin:  Warm and dry.    Neurologic: Alert & oriented x 3, CN II-XII grossly intact, strength and sensation grossly intact.  No focal deficits noted.  Psychiatric:  Affect normal, mood normal, judgment normal.    Assessment/Plan:     1. Essential hypertension  Low-salt diet and exercise to be maintained along with medications as prescribed and we discussed about tapering amlodipine to only half the dose but in the past she was told by Dr. Dillard her cardiologist to just remain on the same medications. She has pacemaker and placed in the past and follows her cardiologist    2. Glucose intolerance (impaired glucose tolerance)  Denies having any new problems and has been doing well with diet and exercise. We will follow through. Most recent A1c is 6.2     3. History of breast cancer  Annual mammogram this year within normal changes    4. Mixed hyperlipidemia  Crestor refilled per patient request and continue diet and exercise. Lipid profile pretty stable for her age  - rosuvastatin (CRESTOR) 10 MG Tab; Take 1 Tab by mouth every evening.  Dispense: 90 Tab; Refill: 2    5. Psychophysiological insomnia  Discussed about sleep hygiene as well as trial of melatonin over-the-counter to help with her falling asleep. Discussed about stress management and patient was planning to give a try      Followup: Return in about 6 months (around 10/26/2017).    Please note that this dictation was created using voice recognition software. I have made every reasonable attempt to correct obvious errors, but I expect that there are errors of grammar and possibly content that I did not discover before finalizing the note.

## 2017-07-14 ENCOUNTER — HOSPITAL ENCOUNTER (OUTPATIENT)
Dept: HOSPITAL 8 - CFH | Age: 77
Discharge: HOME | End: 2017-07-14
Attending: INTERNAL MEDICINE
Payer: MEDICARE

## 2017-07-14 DIAGNOSIS — E78.5: ICD-10-CM

## 2017-07-14 DIAGNOSIS — Z95.0: ICD-10-CM

## 2017-07-14 DIAGNOSIS — I34.0: Primary | ICD-10-CM

## 2017-07-14 DIAGNOSIS — I10: ICD-10-CM

## 2017-07-14 DIAGNOSIS — I35.0: ICD-10-CM

## 2017-07-14 PROCEDURE — 93306 TTE W/DOPPLER COMPLETE: CPT

## 2017-10-26 DIAGNOSIS — J30.1 SEASONAL ALLERGIC RHINITIS DUE TO POLLEN: ICD-10-CM

## 2017-10-30 RX ORDER — FLUTICASONE PROPIONATE 50 MCG
2 SPRAY, SUSPENSION (ML) NASAL DAILY
Qty: 3 BOTTLE | Refills: 2 | Status: SHIPPED | OUTPATIENT
Start: 2017-10-30 | End: 2018-08-09 | Stop reason: SDUPTHER

## 2017-10-30 NOTE — TELEPHONE ENCOUNTER
Last seen: 04/26/17 by Dr. Figueroa  Next appt: 11/15/17 with Dr. Figueroa    Was the patient seen in the last year in this department? Yes   Does patient have an active prescription for medications requested? No   Received Request Via: Pharmacy

## 2017-11-15 ENCOUNTER — OFFICE VISIT (OUTPATIENT)
Dept: INTERNAL MEDICINE | Facility: MEDICAL CENTER | Age: 77
End: 2017-11-15
Payer: MEDICARE

## 2017-11-15 VITALS
HEIGHT: 59 IN | BODY MASS INDEX: 23.51 KG/M2 | RESPIRATION RATE: 18 BRPM | WEIGHT: 116.6 LBS | DIASTOLIC BLOOD PRESSURE: 60 MMHG | OXYGEN SATURATION: 98 % | TEMPERATURE: 97.9 F | SYSTOLIC BLOOD PRESSURE: 110 MMHG | HEART RATE: 87 BPM

## 2017-11-15 DIAGNOSIS — Z23 NEED FOR VACCINATION: ICD-10-CM

## 2017-11-15 DIAGNOSIS — H53.9 VISION CHANGES: ICD-10-CM

## 2017-11-15 DIAGNOSIS — R73.02 GLUCOSE INTOLERANCE (IMPAIRED GLUCOSE TOLERANCE): ICD-10-CM

## 2017-11-15 DIAGNOSIS — M81.0 AGE-RELATED OSTEOPOROSIS WITHOUT CURRENT PATHOLOGICAL FRACTURE: ICD-10-CM

## 2017-11-15 DIAGNOSIS — I10 ESSENTIAL HYPERTENSION: ICD-10-CM

## 2017-11-15 PROCEDURE — 90732 PPSV23 VACC 2 YRS+ SUBQ/IM: CPT | Performed by: INTERNAL MEDICINE

## 2017-11-15 PROCEDURE — G0008 ADMIN INFLUENZA VIRUS VAC: HCPCS | Performed by: INTERNAL MEDICINE

## 2017-11-15 PROCEDURE — 99214 OFFICE O/P EST MOD 30 MIN: CPT | Mod: 25 | Performed by: INTERNAL MEDICINE

## 2017-11-15 PROCEDURE — 90662 IIV NO PRSV INCREASED AG IM: CPT | Performed by: INTERNAL MEDICINE

## 2017-11-15 PROCEDURE — G0009 ADMIN PNEUMOCOCCAL VACCINE: HCPCS | Performed by: INTERNAL MEDICINE

## 2017-11-15 RX ORDER — LISINOPRIL AND HYDROCHLOROTHIAZIDE 20; 12.5 MG/1; MG/1
1 TABLET ORAL DAILY
Qty: 90 TAB | Refills: 3 | Status: SHIPPED | OUTPATIENT
Start: 2017-11-15 | End: 2021-10-21

## 2017-11-15 ASSESSMENT — PAIN SCALES - GENERAL: PAINLEVEL: NO PAIN

## 2017-11-15 NOTE — PROGRESS NOTES
date  Chief Complaint   Patient presents with   • Insomnia     f/u   • Medication Refill     lisinopril       HISTORY OF PRESENT ILLNESS: Patient is a 77 y.o. female established patient who presents today for the following.      1. Vision changes  Complaint of changes in her vision with cataracts in the past. States she had one surgery done in  and one in the [facility]. Would like to see an eye doctor. Denies having any headaches    2. Essential hypertension  Taking blood pressure pills on a regular basis and needs refill of lisinopril hydrochlorothiazide. She also sees cardiologist and gets amlodipine for her heart. Otherwise has no complaints of chest pain, shortness of breath or dizziness.     3. Age-related osteoporosis without current pathological fracture  In the past patient was on Fosamax for a few years and later was tried on some injection medication once a month which made her really sick. She has been on drug holiday from Fosamax and bisphosphonates for 2 years. States she has been exercising on treadmill 20-25 minutes on daily basis 5 days a week on average. Still active and takes care of grandkids at home    4. Need for vaccination  Interesting getting a flu shot and pneumonia vaccine today    5. Glucose intolerance (impaired glucose tolerance)  Working on her diet and exercise and denies having any new problems of polyuria polydipsia      Past Medical History:   Diagnosis Date   • Abnormal electrocardiogram 5/24/2010   • DDD (degenerative disc disease), cervical    • Dyslipidemia    • Edema 9/21/2011   • Family history of coronary artery disease 4/5/2012   • Foot pain 4/8/2009   • History of breast cancer 4/8/2009   • HTN (hypertension) 9/21/2011   • Hyperlipidemia 9/21/2011   • Insomnia    • Murmur 9/21/2011   • Nonspecific abnormal unspecified cardiovascular function study 4/30/2009   • Osteoporosis    • Vitamin d deficiency 10/18/2012   • Vitiligo        Patient Active Problem List    Diagnosis  Date Noted   • Family history of coronary artery disease 04/05/2012     Priority: Medium   • Hyperlipidemia 09/21/2011     Priority: Medium   • Essential hypertension 09/21/2011     Priority: Medium   • Age-related osteoporosis without current pathological fracture 11/15/2017   • Glucose intolerance (impaired glucose tolerance) 04/26/2017   • Vitamin d deficiency 10/18/2012   • Murmur 09/21/2011   • Abnormal electrocardiogram 05/24/2010   • Nonspecific abnormal results of cardiovascular function study 04/30/2009   • History of breast cancer 04/08/2009       Allergies:Patient has no known allergies.    Current Outpatient Prescriptions   Medication Sig Dispense Refill   • lisinopril-hydrochlorothiazide (PRINZIDE, ZESTORETIC) 20-12.5 MG per tablet Take 1 Tab by mouth every day. 90 Tab 3   • fluticasone (FLONASE) 50 MCG/ACT nasal spray SPRAY 2 SPRAYS IN NOSE EVERY DAY. 3 Bottle 2   • rosuvastatin (CRESTOR) 10 MG Tab Take 1 Tab by mouth every evening. 90 Tab 2   • cetirizine (ZYRTEC) 10 MG Tab Take 10 mg by mouth every day.     • FISH OIL by Does not apply route.       • amlodipine (NORVASC) 10 MG TABS Take 10 mg by mouth every day.     • aspirin 81 MG tablet Take  by mouth every day.     • Calcium Carbonate-Vitamin D (CALCIUM + D PO) Take  by mouth every day.     • Cholecalciferol (VITAMIN D) 2000 UNIT TABS Take  by mouth. Take 1 tablet daily.        No current facility-administered medications for this visit.        Social History   Substance Use Topics   • Smoking status: Never Smoker   • Smokeless tobacco: Never Used   • Alcohol use No       Family History   Problem Relation Age of Onset   • Stroke Father    • Arthritis Mother    • Breast Cancer Sister      X 2         Review of Systems   Patient denies Fevers/chills/nausea/vomiting/chest pain/sob/blood in stools/black stools/blood in urine.all other systems are reviewed See HPI    Exam:  Blood pressure 110/60, pulse 87, temperature 36.6 °C (97.9 °F), resp. rate 18,  "height 1.499 m (4' 11.02\"), weight 52.9 kg (116 lb 9.6 oz), SpO2 98 %, not currently breastfeeding. Body mass index is 23.54 kg/m².  Constitutional:  NAD, well appearing.  HEENT:   NC/AT  Cardiovascular: RRR.   No m/r/g. No carotid bruits.       Lungs:   CTAB, no w/r/r, no respiratory distress.  Abdomen: Soft, NT/ND + BS, no masses, no suprapubic tenderness, no hepatomegaly.  Extremities:  2+ DP and radial pulses bilaterally.  No c/c/e.  Skin:  Warm and dry.    Neurologic: Alert & oriented x 3, CN II-XII grossly intact, strength and sensation grossly intact.  No focal deficits noted.  Psychiatric:  Affect normal, mood normal, judgment normal.    Assessment/Plan:     1. Vision changes  Patient referred to ophthalmologist with a prior history of cataract surgeries and requiring glasses which are not convenient.  - REFERRAL TO OPHTHALMOLOGY  - CBC WITH DIFFERENTIAL; Future    2. Essential hypertension  Continue blood pressure medications as prescribed and she is very well controlled for her age without any symptoms  - lisinopril-hydrochlorothiazide (PRINZIDE, ZESTORETIC) 20-12.5 MG per tablet; Take 1 Tab by mouth every day.  Dispense: 90 Tab; Refill: 3  - COMP METABOLIC PANEL; Future    3. Age-related osteoporosis without current pathological fracture  Patient has a T score of -3.6 in January 2015 and has been on drug holiday from bisphosphonates. We will recheck DEXA scan to evaluate her current scores and we discussed about maybe Reclast once a year which patient is willing to consider. Continue calcium supplements vitamin D and walking to increase her bone strength  - DS-BONE DENSITY STUDY (DEXA); Future  - COMP METABOLIC PANEL; Future  - VITAMIN D,25 HYDROXY; Future    4. Need for vaccination  Flu shot given today in the office as well as pneumonia vaccine  - INFLUENZA VACCINE, HIGH DOSE (65+ ONLY)  - PneumoVax PPV23 =>3yo    5. Glucose intolerance (impaired glucose tolerance)  Diet and exercise to be maintained " and follow-up with an A1c for next visit  - HEMOGLOBIN A1C; Future      All imaging results and lab results and consult notes are reviewed at this visit.  Followup: Return in about 6 months (around 5/15/2018).    Please note that this dictation was created using voice recognition software. I have made every reasonable attempt to correct obvious errors, but I expect that there are errors of grammar and possibly content that I did not discover before finalizing the note.

## 2017-12-11 ENCOUNTER — HOSPITAL ENCOUNTER (OUTPATIENT)
Dept: LAB | Facility: MEDICAL CENTER | Age: 77
End: 2017-12-11
Attending: INTERNAL MEDICINE
Payer: MEDICARE

## 2017-12-11 DIAGNOSIS — I10 ESSENTIAL HYPERTENSION: ICD-10-CM

## 2017-12-11 DIAGNOSIS — M81.0 AGE-RELATED OSTEOPOROSIS WITHOUT CURRENT PATHOLOGICAL FRACTURE: ICD-10-CM

## 2017-12-11 DIAGNOSIS — H53.9 VISION CHANGES: ICD-10-CM

## 2017-12-11 DIAGNOSIS — R73.02 GLUCOSE INTOLERANCE (IMPAIRED GLUCOSE TOLERANCE): ICD-10-CM

## 2017-12-11 LAB
25(OH)D3 SERPL-MCNC: 41 NG/ML (ref 30–100)
ALBUMIN SERPL BCP-MCNC: 4.1 G/DL (ref 3.2–4.9)
ALBUMIN/GLOB SERPL: 1.6 G/DL
ALP SERPL-CCNC: 67 U/L (ref 30–99)
ALT SERPL-CCNC: 15 U/L (ref 2–50)
ANION GAP SERPL CALC-SCNC: 8 MMOL/L (ref 0–11.9)
AST SERPL-CCNC: 27 U/L (ref 12–45)
BILIRUB SERPL-MCNC: 0.3 MG/DL (ref 0.1–1.5)
BUN SERPL-MCNC: 22 MG/DL (ref 8–22)
CALCIUM SERPL-MCNC: 10.2 MG/DL (ref 8.5–10.5)
CHLORIDE SERPL-SCNC: 101 MMOL/L (ref 96–112)
CO2 SERPL-SCNC: 26 MMOL/L (ref 20–33)
CREAT SERPL-MCNC: 0.57 MG/DL (ref 0.5–1.4)
GFR SERPL CREATININE-BSD FRML MDRD: >60 ML/MIN/1.73 M 2
GLOBULIN SER CALC-MCNC: 2.6 G/DL (ref 1.9–3.5)
GLUCOSE SERPL-MCNC: 97 MG/DL (ref 65–99)
POTASSIUM SERPL-SCNC: 3.9 MMOL/L (ref 3.6–5.5)
PROT SERPL-MCNC: 6.7 G/DL (ref 6–8.2)
SODIUM SERPL-SCNC: 135 MMOL/L (ref 135–145)

## 2017-12-11 PROCEDURE — 82306 VITAMIN D 25 HYDROXY: CPT

## 2017-12-11 PROCEDURE — 36415 COLL VENOUS BLD VENIPUNCTURE: CPT

## 2017-12-11 PROCEDURE — 83036 HEMOGLOBIN GLYCOSYLATED A1C: CPT | Mod: GA

## 2017-12-11 PROCEDURE — 80053 COMPREHEN METABOLIC PANEL: CPT

## 2017-12-11 PROCEDURE — 85025 COMPLETE CBC W/AUTO DIFF WBC: CPT

## 2017-12-12 LAB
BASOPHILS # BLD AUTO: 1.5 % (ref 0–1.8)
BASOPHILS # BLD: 0.08 K/UL (ref 0–0.12)
EOSINOPHIL # BLD AUTO: 0.26 K/UL (ref 0–0.51)
EOSINOPHIL NFR BLD: 5 % (ref 0–6.9)
ERYTHROCYTE [DISTWIDTH] IN BLOOD BY AUTOMATED COUNT: 48.1 FL (ref 35.9–50)
HCT VFR BLD AUTO: 40.8 % (ref 37–47)
HGB BLD-MCNC: 13.4 G/DL (ref 12–16)
IMM GRANULOCYTES # BLD AUTO: 0.01 K/UL (ref 0–0.11)
IMM GRANULOCYTES NFR BLD AUTO: 0.2 % (ref 0–0.9)
LYMPHOCYTES # BLD AUTO: 1.55 K/UL (ref 1–4.8)
LYMPHOCYTES NFR BLD: 30 % (ref 22–41)
MCH RBC QN AUTO: 29.6 PG (ref 27–33)
MCHC RBC AUTO-ENTMCNC: 32.8 G/DL (ref 33.6–35)
MCV RBC AUTO: 90.1 FL (ref 81.4–97.8)
MONOCYTES # BLD AUTO: 0.42 K/UL (ref 0–0.85)
MONOCYTES NFR BLD AUTO: 8.1 % (ref 0–13.4)
NEUTROPHILS # BLD AUTO: 2.85 K/UL (ref 2–7.15)
NEUTROPHILS NFR BLD: 55.2 % (ref 44–72)
NRBC # BLD AUTO: 0 K/UL
NRBC BLD AUTO-RTO: 0 /100 WBC
PLATELET # BLD AUTO: 225 K/UL (ref 164–446)
PMV BLD AUTO: 10.9 FL (ref 9–12.9)
RBC # BLD AUTO: 4.53 M/UL (ref 4.2–5.4)
WBC # BLD AUTO: 5.2 K/UL (ref 4.8–10.8)

## 2017-12-13 LAB
EST. AVERAGE GLUCOSE BLD GHB EST-MCNC: 134 MG/DL
HBA1C MFR BLD: 6.3 % (ref 0–5.6)

## 2017-12-14 ENCOUNTER — HOSPITAL ENCOUNTER (OUTPATIENT)
Dept: RADIOLOGY | Facility: MEDICAL CENTER | Age: 77
End: 2017-12-14
Attending: INTERNAL MEDICINE
Payer: MEDICARE

## 2017-12-14 DIAGNOSIS — M81.0 AGE-RELATED OSTEOPOROSIS WITHOUT CURRENT PATHOLOGICAL FRACTURE: ICD-10-CM

## 2017-12-14 PROCEDURE — 77080 DXA BONE DENSITY AXIAL: CPT

## 2017-12-15 ENCOUNTER — TELEPHONE (OUTPATIENT)
Dept: INTERNAL MEDICINE | Facility: MEDICAL CENTER | Age: 77
End: 2017-12-15

## 2017-12-15 DIAGNOSIS — M81.0 AGE-RELATED OSTEOPOROSIS WITHOUT CURRENT PATHOLOGICAL FRACTURE: ICD-10-CM

## 2018-01-22 ENCOUNTER — TELEPHONE (OUTPATIENT)
Dept: INTERNAL MEDICINE | Facility: MEDICAL CENTER | Age: 78
End: 2018-01-22

## 2018-01-22 NOTE — TELEPHONE ENCOUNTER
1. Caller Name: Leah Cortez                                         Call Back Number: [phone] (home)       Patient approves a detailed voicemail message: yes    Patient called stating that her left eye lid and under the eye is still swelling, she stated she went to an urgent care (didnt specify which UC) and was evaluated and was given an injection and the swelling was okay after 4 days and then it came back and its still swollen again with itching and dryness and is not sure if she needs to see a Dermatologist or not.

## 2018-01-23 NOTE — TELEPHONE ENCOUNTER
pls call pt and tell her to see her EYE doctor as soon as possible. Eyelid infection can cause trouble.

## 2018-01-25 ENCOUNTER — TELEPHONE (OUTPATIENT)
Dept: INTERNAL MEDICINE | Facility: MEDICAL CENTER | Age: 78
End: 2018-01-25

## 2018-01-25 DIAGNOSIS — L30.9 DERMATITIS: ICD-10-CM

## 2018-01-25 DIAGNOSIS — E78.2 MIXED HYPERLIPIDEMIA: Chronic | ICD-10-CM

## 2018-01-25 RX ORDER — ROSUVASTATIN CALCIUM 10 MG/1
10 TABLET, COATED ORAL EVERY EVENING
Qty: 90 TAB | Refills: 1 | Status: SHIPPED | OUTPATIENT
Start: 2018-01-25 | End: 2019-01-08 | Stop reason: SDUPTHER

## 2018-01-25 NOTE — TELEPHONE ENCOUNTER
Patient called stating she has an allergic reaction , itching, redness around the face. She cancelled her eye appointment and would like to know if she can get a referral to Dermatology because its possible that she has Dermatitis.

## 2018-01-25 NOTE — TELEPHONE ENCOUNTER
Last seen: 11/15/17 by Dr. Figueroa  Next appt: 05/16/18 with Dr. Figueroa    Was the patient seen in the last year in this department? Yes   Does patient have an active prescription for medications requested? No   Received Request Via: Pharmacy

## 2018-02-23 ENCOUNTER — HOSPITAL ENCOUNTER (OUTPATIENT)
Dept: LAB | Facility: MEDICAL CENTER | Age: 78
End: 2018-02-23
Attending: INTERNAL MEDICINE
Payer: MEDICARE

## 2018-02-23 LAB
25(OH)D3 SERPL-MCNC: 36 NG/ML (ref 30–100)
ALBUMIN SERPL BCP-MCNC: 4.1 G/DL (ref 3.2–4.9)
BUN SERPL-MCNC: 14 MG/DL (ref 8–22)
CALCIUM SERPL-MCNC: 8.7 MG/DL (ref 8.5–10.5)
CHLORIDE SERPL-SCNC: 99 MMOL/L (ref 96–112)
CO2 SERPL-SCNC: 25 MMOL/L (ref 20–33)
CREAT SERPL-MCNC: 0.63 MG/DL (ref 0.5–1.4)
GLUCOSE SERPL-MCNC: 96 MG/DL (ref 65–99)
PHOSPHATE SERPL-MCNC: 2.9 MG/DL (ref 2.5–4.5)
POTASSIUM SERPL-SCNC: 4 MMOL/L (ref 3.6–5.5)
PTH-INTACT SERPL-MCNC: 67.1 PG/ML (ref 14–72)
SODIUM SERPL-SCNC: 133 MMOL/L (ref 135–145)

## 2018-02-23 PROCEDURE — 80069 RENAL FUNCTION PANEL: CPT

## 2018-02-23 PROCEDURE — 83970 ASSAY OF PARATHORMONE: CPT

## 2018-02-23 PROCEDURE — 36415 COLL VENOUS BLD VENIPUNCTURE: CPT

## 2018-02-23 PROCEDURE — 82306 VITAMIN D 25 HYDROXY: CPT

## 2018-02-26 ENCOUNTER — HOSPITAL ENCOUNTER (OUTPATIENT)
Facility: MEDICAL CENTER | Age: 78
End: 2018-02-26
Attending: INTERNAL MEDICINE
Payer: MEDICARE

## 2018-02-26 PROCEDURE — 82340 ASSAY OF CALCIUM IN URINE: CPT

## 2018-02-26 PROCEDURE — 82523 COLLAGEN CROSSLINKS: CPT

## 2018-02-28 LAB
CALCIUM 24H UR-MCNC: 5.5 MG/DL
CALCIUM 24H UR-MRATE: 121 MG/D
CALCIUM/CREAT 24H UR: 183 MG/G (ref 20–300)
COLLECT DURATION TIME SPEC: 24 HRS
CREAT 24H UR-MCNC: 30 MG/DL
CREAT 24H UR-MRATE: 660 MG/D (ref 500–1400)
SPECIMEN VOL ?TM UR: 2200 ML

## 2018-03-01 LAB — COLLAGEN NTX/CREAT UR-SRTO: 23

## 2018-03-06 ENCOUNTER — OFFICE VISIT (OUTPATIENT)
Dept: INTERNAL MEDICINE | Facility: MEDICAL CENTER | Age: 78
End: 2018-03-06
Payer: MEDICARE

## 2018-03-06 VITALS — HEIGHT: 59 IN | BODY MASS INDEX: 22.18 KG/M2 | WEIGHT: 110 LBS

## 2018-03-06 DIAGNOSIS — L50.9 URTICARIA: ICD-10-CM

## 2018-03-06 DIAGNOSIS — L81.4 LENTIGO: ICD-10-CM

## 2018-03-06 PROCEDURE — 99202 OFFICE O/P NEW SF 15 MIN: CPT | Performed by: DERMATOLOGY

## 2018-03-06 NOTE — PROGRESS NOTES
"Leah Cortez  1940  [id number]    Consultation             Vitals:    03/06/18 0959   Weight: 49.9 kg (110 lb)   Height: 1.499 m (4' 11\")       Chief Complaint   Patient presents with   • New Patient     Dermatitis      ___________________________________________________________________            History of Present Illness (HPI) c/o rash on face and breakout with swelling   Every year around december to april. Pt goes to urgent care to get steroids  Which help. No h/o allergy testing. Rash is better now.            Dr. Miller has referred for a consultation to evaluate rash    Current Outpatient Prescriptions on File Prior to Visit   Medication Sig Dispense Refill   • rosuvastatin (CRESTOR) 10 MG Tab TAKE 1 TAB BY MOUTH EVERY EVENING. 90 Tab 1   • lisinopril-hydrochlorothiazide (PRINZIDE, ZESTORETIC) 20-12.5 MG per tablet Take 1 Tab by mouth every day. 90 Tab 3   • FISH OIL by Does not apply route.       • amlodipine (NORVASC) 10 MG TABS Take 10 mg by mouth every day.     • aspirin 81 MG tablet Take  by mouth every day.     • Calcium Carbonate-Vitamin D (CALCIUM + D PO) Take  by mouth every day.     • Cholecalciferol (VITAMIN D) 2000 UNIT TABS Take  by mouth. Take 1 tablet daily.      • fluticasone (FLONASE) 50 MCG/ACT nasal spray SPRAY 2 SPRAYS IN NOSE EVERY DAY. 3 Bottle 2   • cetirizine (ZYRTEC) 10 MG Tab Take 10 mg by mouth every day.       No current facility-administered medications on file prior to visit.      Patient has no known allergies.      Review of Systems:        General: Denies fever      Hematologic: no excessive bleeding problems              Past Medical History:   Diagnosis Date   • Abnormal electrocardiogram 5/24/2010   • DDD (degenerative disc disease), cervical    • Dyslipidemia    • Edema 9/21/2011   • Family history of coronary artery disease 4/5/2012   • Foot pain 4/8/2009   • History of breast cancer 4/8/2009   • HTN (hypertension) 9/21/2011   • Hyperlipidemia 9/21/2011   • " Insomnia    • Murmur 9/21/2011   • Nonspecific abnormal unspecified cardiovascular function study 4/30/2009   • Osteoporosis    • Vitamin d deficiency 10/18/2012   • Vitiligo      Skin Cancer no h/o    Social History   Substance Use Topics   • Smoking status: Never Smoker   • Smokeless tobacco: Never Used   • Alcohol use No     Sunscreen Use:No     Past Surgical History:   Procedure Laterality Date   • CYST EXCISION      LUNG   • HYSTERECTOMY, TOTAL ABDOMINAL     • OTHER      BREAST SURGERY LUMPECTOMY.   • OTHER      PACEMAKER           Family History   Problem Relation Age of Onset   • Stroke Father    • Arthritis Mother    • Breast Cancer Sister      X 2         Physical Exam:   Constitutional: Well nourished, NAD, pleasant  alert and cooperative; normal mood and affect; normal attention span and concentration.    Skin face exam done: no suspicious lesions on eyelids, lips, face, ears, except as noted.  Mild swelling on L lower eyelid and L cheek.                   Assessment and Plan:   1. Urticaria  Pt may have an allergic component b/c swelling every year about the same time.   Recommend pt see allergist for allergy testing. Pt not sure if food is triggering.   May be pollen b/c flares every december to april. Pt has been doing well with steroids  Consider xolair with allergist as steroid sparing option.     Pt has minimal rash, so defers topical steroid for now.   Pt can take antihistamine claritin.     2. Lentigo  Recommend follow for changes. ABCD's of changing skin lesions were reviewed, and the appropriate use of sunscreen was outlined and recommended.        Kelley Mir M.D.   Return if symptoms worsen or fail to improve.

## 2018-03-09 ENCOUNTER — TELEPHONE (OUTPATIENT)
Dept: INTERNAL MEDICINE | Facility: MEDICAL CENTER | Age: 78
End: 2018-03-09

## 2018-03-09 DIAGNOSIS — Z12.39 SCREENING FOR BREAST CANCER: ICD-10-CM

## 2018-03-09 NOTE — TELEPHONE ENCOUNTER
1. Caller Name: Leah Cortez                                         Call Back Number: [phone] (home)         Patient approves a detailed voicemail message: yes    Patient called stating that she is due for an annual mammogram and would like an order placed.

## 2018-03-12 NOTE — TELEPHONE ENCOUNTER
Called pt and left a vm stating order has been placed and has been sent by mail as of last Friday.

## 2018-04-03 ENCOUNTER — HOSPITAL ENCOUNTER (OUTPATIENT)
Dept: RADIOLOGY | Facility: MEDICAL CENTER | Age: 78
End: 2018-04-03

## 2018-04-04 ENCOUNTER — HOSPITAL ENCOUNTER (OUTPATIENT)
Dept: RADIOLOGY | Facility: MEDICAL CENTER | Age: 78
End: 2018-04-04
Attending: INTERNAL MEDICINE
Payer: MEDICARE

## 2018-04-04 DIAGNOSIS — Z12.39 SCREENING FOR BREAST CANCER: ICD-10-CM

## 2018-04-04 PROCEDURE — 77063 BREAST TOMOSYNTHESIS BI: CPT

## 2018-05-10 ENCOUNTER — HOSPITAL ENCOUNTER (OUTPATIENT)
Dept: LAB | Facility: MEDICAL CENTER | Age: 78
End: 2018-05-10
Attending: INTERNAL MEDICINE
Payer: MEDICARE

## 2018-05-10 ENCOUNTER — OFFICE VISIT (OUTPATIENT)
Dept: INTERNAL MEDICINE | Facility: MEDICAL CENTER | Age: 78
End: 2018-05-10
Payer: MEDICARE

## 2018-05-10 ENCOUNTER — HOSPITAL ENCOUNTER (OUTPATIENT)
Dept: RADIOLOGY | Facility: MEDICAL CENTER | Age: 78
End: 2018-05-10
Attending: INTERNAL MEDICINE
Payer: MEDICARE

## 2018-05-10 VITALS
BODY MASS INDEX: 21.57 KG/M2 | TEMPERATURE: 98.2 F | WEIGHT: 107 LBS | OXYGEN SATURATION: 94 % | HEART RATE: 89 BPM | DIASTOLIC BLOOD PRESSURE: 62 MMHG | HEIGHT: 59 IN | SYSTOLIC BLOOD PRESSURE: 118 MMHG

## 2018-05-10 DIAGNOSIS — R63.4 UNEXPLAINED WEIGHT LOSS: ICD-10-CM

## 2018-05-10 DIAGNOSIS — M81.0 AGE-RELATED OSTEOPOROSIS WITHOUT CURRENT PATHOLOGICAL FRACTURE: ICD-10-CM

## 2018-05-10 DIAGNOSIS — I10 ESSENTIAL HYPERTENSION: ICD-10-CM

## 2018-05-10 DIAGNOSIS — R73.02 GLUCOSE INTOLERANCE (IMPAIRED GLUCOSE TOLERANCE): ICD-10-CM

## 2018-05-10 DIAGNOSIS — J30.1 SEASONAL ALLERGIC RHINITIS DUE TO POLLEN: ICD-10-CM

## 2018-05-10 DIAGNOSIS — J18.9 PNEUMONIA OF RIGHT UPPER LOBE DUE TO INFECTIOUS ORGANISM: ICD-10-CM

## 2018-05-10 LAB
ALBUMIN SERPL BCP-MCNC: 3.8 G/DL (ref 3.2–4.9)
ALBUMIN/GLOB SERPL: 1.2 G/DL
ALP SERPL-CCNC: 61 U/L (ref 30–99)
ALT SERPL-CCNC: 10 U/L (ref 2–50)
ANION GAP SERPL CALC-SCNC: 11 MMOL/L (ref 0–11.9)
AST SERPL-CCNC: 20 U/L (ref 12–45)
BASOPHILS # BLD AUTO: 0.5 % (ref 0–1.8)
BASOPHILS # BLD: 0.04 K/UL (ref 0–0.12)
BILIRUB SERPL-MCNC: 0.4 MG/DL (ref 0.1–1.5)
BUN SERPL-MCNC: 21 MG/DL (ref 8–22)
CALCIUM SERPL-MCNC: 9.3 MG/DL (ref 8.5–10.5)
CHLORIDE SERPL-SCNC: 96 MMOL/L (ref 96–112)
CO2 SERPL-SCNC: 25 MMOL/L (ref 20–33)
CREAT SERPL-MCNC: 0.73 MG/DL (ref 0.5–1.4)
EOSINOPHIL # BLD AUTO: 0.1 K/UL (ref 0–0.51)
EOSINOPHIL NFR BLD: 1.4 % (ref 0–6.9)
ERYTHROCYTE [DISTWIDTH] IN BLOOD BY AUTOMATED COUNT: 46.6 FL (ref 35.9–50)
EST. AVERAGE GLUCOSE BLD GHB EST-MCNC: 134 MG/DL
GLOBULIN SER CALC-MCNC: 3.2 G/DL (ref 1.9–3.5)
GLUCOSE SERPL-MCNC: 94 MG/DL (ref 65–99)
HBA1C MFR BLD: 6.3 % (ref 0–5.6)
HCT VFR BLD AUTO: 40.4 % (ref 37–47)
HGB BLD-MCNC: 13.3 G/DL (ref 12–16)
IMM GRANULOCYTES # BLD AUTO: 0.02 K/UL (ref 0–0.11)
IMM GRANULOCYTES NFR BLD AUTO: 0.3 % (ref 0–0.9)
LYMPHOCYTES # BLD AUTO: 1.21 K/UL (ref 1–4.8)
LYMPHOCYTES NFR BLD: 16.5 % (ref 22–41)
MCH RBC QN AUTO: 29.6 PG (ref 27–33)
MCHC RBC AUTO-ENTMCNC: 32.9 G/DL (ref 33.6–35)
MCV RBC AUTO: 90 FL (ref 81.4–97.8)
MONOCYTES # BLD AUTO: 0.48 K/UL (ref 0–0.85)
MONOCYTES NFR BLD AUTO: 6.5 % (ref 0–13.4)
NEUTROPHILS # BLD AUTO: 5.49 K/UL (ref 2–7.15)
NEUTROPHILS NFR BLD: 74.8 % (ref 44–72)
NRBC # BLD AUTO: 0 K/UL
NRBC BLD-RTO: 0 /100 WBC
PLATELET # BLD AUTO: 284 K/UL (ref 164–446)
PMV BLD AUTO: 10.1 FL (ref 9–12.9)
POTASSIUM SERPL-SCNC: 3.6 MMOL/L (ref 3.6–5.5)
PROT SERPL-MCNC: 7 G/DL (ref 6–8.2)
RBC # BLD AUTO: 4.49 M/UL (ref 4.2–5.4)
SODIUM SERPL-SCNC: 132 MMOL/L (ref 135–145)
TSH SERPL DL<=0.005 MIU/L-ACNC: 0.99 UIU/ML (ref 0.38–5.33)
WBC # BLD AUTO: 7.3 K/UL (ref 4.8–10.8)

## 2018-05-10 PROCEDURE — 36415 COLL VENOUS BLD VENIPUNCTURE: CPT

## 2018-05-10 PROCEDURE — 80053 COMPREHEN METABOLIC PANEL: CPT

## 2018-05-10 PROCEDURE — 85025 COMPLETE CBC W/AUTO DIFF WBC: CPT

## 2018-05-10 PROCEDURE — 99214 OFFICE O/P EST MOD 30 MIN: CPT | Performed by: INTERNAL MEDICINE

## 2018-05-10 PROCEDURE — 83036 HEMOGLOBIN GLYCOSYLATED A1C: CPT | Mod: GA

## 2018-05-10 PROCEDURE — 84443 ASSAY THYROID STIM HORMONE: CPT

## 2018-05-10 PROCEDURE — 71046 X-RAY EXAM CHEST 2 VIEWS: CPT

## 2018-05-10 RX ORDER — OFLOXACIN 3 MG/ML
SOLUTION/ DROPS OPHTHALMIC
COMMUNITY
Start: 2018-05-08 | End: 2019-03-06

## 2018-05-10 RX ORDER — PREDNISOLONE ACETATE 10 MG/ML
SUSPENSION/ DROPS OPHTHALMIC
COMMUNITY
Start: 2018-05-08 | End: 2019-03-06

## 2018-05-10 RX ORDER — MONTELUKAST SODIUM 10 MG/1
10 TABLET ORAL DAILY
Qty: 30 TAB | Refills: 4 | Status: SHIPPED | OUTPATIENT
Start: 2018-05-10 | End: 2018-09-05 | Stop reason: SDUPTHER

## 2018-05-10 RX ORDER — LEVOFLOXACIN 500 MG/1
500 TABLET, FILM COATED ORAL DAILY
Qty: 10 TAB | Refills: 0 | Status: SHIPPED | OUTPATIENT
Start: 2018-05-10 | End: 2018-12-06

## 2018-05-10 ASSESSMENT — PATIENT HEALTH QUESTIONNAIRE - PHQ9: CLINICAL INTERPRETATION OF PHQ2 SCORE: 0

## 2018-05-11 ENCOUNTER — HOSPITAL ENCOUNTER (OUTPATIENT)
Dept: LAB | Facility: MEDICAL CENTER | Age: 78
End: 2018-05-11
Attending: INTERNAL MEDICINE
Payer: MEDICARE

## 2018-05-11 DIAGNOSIS — R63.4 UNEXPLAINED WEIGHT LOSS: ICD-10-CM

## 2018-05-11 DIAGNOSIS — J18.9 PNEUMONIA OF RIGHT UPPER LOBE DUE TO INFECTIOUS ORGANISM: ICD-10-CM

## 2018-05-11 PROCEDURE — 36415 COLL VENOUS BLD VENIPUNCTURE: CPT

## 2018-05-11 PROCEDURE — 86480 TB TEST CELL IMMUN MEASURE: CPT

## 2018-05-11 NOTE — PROGRESS NOTES
date  Chief Complaint   Patient presents with   • Cough     w/ phlem, x 6 days       HISTORY OF PRESENT ILLNESS: Patient is a 78 y.o. female established patient who presents today for the following.      1. Seasonal allergic rhinitis due to pollen  Complaint of cough for last 6 days with white phlegm. Denies having any fevers or chills. Denies any sick contacts. Does have allergies. She also has occasional asthma-like episodes.    2. Unexplained weight loss  Patient has not complained of weight loss but I noticed she lost 9 pounds in last 6 months. States her appetite is okay and he had she has not made any change for intentional weight loss. No recent travel to any place out of the country    3. Essential hypertension  Taking her blood pressure medication amlodipine and lisinopril HCTZ on regular basis and denies having headaches or dizziness or blurry vision.    4. Glucose intolerance (impaired glucose tolerance)  Trying to watch her sugar intake otherwise doing well and active    5. Age-related osteoporosis without current pathological fracture  Patient was on Fosamax for 5 years and has been on drug holiday for last 2 years. Trying to walk and exercise as she can and vitamin D supplement along with calcium.    6. Pneumonia of right upper lobe due to infectious organism (HCC)  Patient had chest x-ray at the time of closure of this note I got the result of pneumonia and called the patient and advised to start antibiotic      Past Medical History:   Diagnosis Date   • Abnormal electrocardiogram 5/24/2010   • DDD (degenerative disc disease), cervical    • Dyslipidemia    • Edema 9/21/2011   • Family history of coronary artery disease 4/5/2012   • Foot pain 4/8/2009   • History of breast cancer 4/8/2009   • HTN (hypertension) 9/21/2011   • Hyperlipidemia 9/21/2011   • Insomnia    • Murmur 9/21/2011   • Nonspecific abnormal unspecified cardiovascular function study 4/30/2009   • Osteoporosis    • Vitamin d deficiency  10/18/2012   • Vitiligo        Patient Active Problem List    Diagnosis Date Noted   • Family history of coronary artery disease 04/05/2012     Priority: Medium   • Hyperlipidemia 09/21/2011     Priority: Medium   • Essential hypertension 09/21/2011     Priority: Medium   • Seasonal allergic rhinitis due to pollen 05/10/2018   • Urticaria 03/06/2018   • Lentigo 03/06/2018   • Age-related osteoporosis without current pathological fracture 11/15/2017   • Glucose intolerance (impaired glucose tolerance) 04/26/2017   • Vitamin d deficiency 10/18/2012   • Murmur 09/21/2011   • Abnormal electrocardiogram 05/24/2010   • Nonspecific abnormal results of cardiovascular function study 04/30/2009   • History of breast cancer 04/08/2009       Allergies:Patient has no known allergies.    Current Outpatient Prescriptions   Medication Sig Dispense Refill   • ofloxacin (OCUFLOX) 0.3 % Solution      • prednisoLONE acetate (PRED FORTE) 1 % Suspension      • montelukast (SINGULAIR) 10 MG Tab Take 1 Tab by mouth every day. 30 Tab 4   • levoFLOXacin (LEVAQUIN) 500 MG tablet Take 1 Tab by mouth every day. 10 Tab 0   • rosuvastatin (CRESTOR) 10 MG Tab TAKE 1 TAB BY MOUTH EVERY EVENING. 90 Tab 1   • lisinopril-hydrochlorothiazide (PRINZIDE, ZESTORETIC) 20-12.5 MG per tablet Take 1 Tab by mouth every day. 90 Tab 3   • fluticasone (FLONASE) 50 MCG/ACT nasal spray SPRAY 2 SPRAYS IN NOSE EVERY DAY. 3 Bottle 2   • amlodipine (NORVASC) 10 MG TABS Take 10 mg by mouth every day.     • FISH OIL by Does not apply route.       • aspirin 81 MG tablet Take  by mouth every day.     • Calcium Carbonate-Vitamin D (CALCIUM + D PO) Take  by mouth every day.     • Cholecalciferol (VITAMIN D) 2000 UNIT TABS Take  by mouth. Take 1 tablet daily.        No current facility-administered medications for this visit.        Social History   Substance Use Topics   • Smoking status: Never Smoker   • Smokeless tobacco: Never Used   • Alcohol use No       Family History  "  Problem Relation Age of Onset   • Stroke Father    • Arthritis Mother    • Breast Cancer Sister      X 2         Review of Systems   Patient denies Fevers/chills/nausea/vomiting/chest pain/sob/blood in stools/black stools/blood in urine.all other systems are reviewed See HPI    Exam:  Blood pressure 118/62, pulse 89, temperature 36.8 °C (98.2 °F), height 1.499 m (4' 11\"), weight 48.5 kg (107 lb), SpO2 94 %. Body mass index is 21.61 kg/m².  Constitutional:  NAD, well appearing.  HEENT:   NC/AT  Cardiovascular: RRR.   No m/r/g. No carotid bruits.       Lungs:   CTAB, no w/r/r, no respiratory distress.  Abdomen: Soft, NT/ND + BS, no masses, no suprapubic tenderness, no hepatomegaly.  Extremities:  2+ DP and radial pulses bilaterally.  No c/c/e.  Skin:  Warm and dry.    Neurologic: Alert & oriented x 3, CN II-XII grossly intact, strength and sensation grossly intact.  No focal deficits noted.  Psychiatric:  Affect normal, mood normal, judgment normal.    CXR:  Interval development right upper lobe infiltrates. Differential considerations include pneumonia. TB not excluded. Malignancy not excluded. Follow-up recommended.    Assessment/Plan:     1. Seasonal allergic rhinitis due to pollen  Advised to start Singulair on a regular basis given her occasional asthma flareups with allergies. We will continue to monitor close  - montelukast (SINGULAIR) 10 MG Tab; Take 1 Tab by mouth every day.  Dispense: 30 Tab; Refill: 4    2. Unexplained weight loss  Patient had unexplained 9 pound weight loss and so will get chest x-ray to look into any suspicious lesion. At the time of closure of the note I already got the chest x-ray result and added quantiferon gold test. Will follow up close along with the other labs  - DX-CHEST-2 VIEWS; Future  - CBC WITH DIFFERENTIAL; Future  - COMP METABOLIC PANEL; Future  - TSH; Future  - Quantiferon Gold TB (PPD); Future    3. Essential hypertension  Blood pressure very stable on her current " regime of lisinopril HCTZ and amlodipine. Continue the same along with low-salt diet and exercise  - COMP METABOLIC PANEL; Future    4. Glucose intolerance (impaired glucose tolerance)  Blood sugars under control and will check A1c. Advised diet and exercise  - HEMOGLOBIN A1C; Future    5. Age-related osteoporosis without current pathological fracture  Patient off of Fosamax for a couple of years. Next visit we will order a DEXA scan again and reassess.    6. Pneumonia of right upper lobe due to infectious organism (HCC)  At the time of closure of the note I got a chest x-ray result already and so started her off on levofloxacin on the same day and call the patient and talked to her about the treatment. Patient has no temperature in the office and pulse and pulse ox are within normal along with blood pressure. Will follow up close. TB or Malignancy not excluded  - levoFLOXacin (LEVAQUIN) 500 MG tablet; Take 1 Tab by mouth every day.  Dispense: 10 Tab; Refill: 0  - Quantiferon Gold TB (PPD); Future      All imaging results and lab results and consult notes are reviewed at this visit.  Followup: Return in about 4 weeks (around 6/7/2018).    Please note that this dictation was created using voice recognition software. I have made every reasonable attempt to correct obvious errors, but I expect that there are errors of grammar and possibly content that I did not discover before finalizing the note.

## 2018-05-13 LAB
M TB TUBERC IFN-G BLD QL: POSITIVE
M TB TUBERC IFN-G/MITOGEN IGNF BLD: 43.09
M TB TUBERC IGNF/MITOGEN IGNF CONTROL: 43.09 [IU]/ML
MITOGEN IGNF BCKGRD COR BLD-ACNC: 2.05 [IU]/ML

## 2018-05-14 ENCOUNTER — TELEPHONE (OUTPATIENT)
Dept: INTERNAL MEDICINE | Facility: MEDICAL CENTER | Age: 78
End: 2018-05-14

## 2018-05-14 DIAGNOSIS — J18.9 PNEUMONIA OF RIGHT UPPER LOBE DUE TO INFECTIOUS ORGANISM: ICD-10-CM

## 2018-05-15 NOTE — TELEPHONE ENCOUNTER
I called patient to discuss about her QuantiFERON test positive. Patient states she has no sick contacts and has not traveled again and the cough was already sent for last one week.  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 Currently she is taking levofloxacin for the treatment of pneumonia and states her cough is already getting better. We will reassess in 2 weeks with a repeat chest x-ray. Follow-up as scheduled

## 2018-05-15 NOTE — TELEPHONE ENCOUNTER
Jayne called from [facility] stating that pt could have pulmonary TB and Dr. Strauss would like to stop Levaquin, and would like pt to go in to do Sputum for AFD. Dr. Strauss will be taking care of the pt for this, Jayne is just informing us.

## 2018-05-15 NOTE — TELEPHONE ENCOUNTER
Jayne Santiago Called from [facility] stating that patient is positive for TB Gold Quanteferon and also her chest X-Ray came out as abnormal that is concerning a possible pulmonary tuberculosis they would like a call back ph. [phone].

## 2018-06-01 ENCOUNTER — TELEPHONE (OUTPATIENT)
Dept: INTERNAL MEDICINE | Facility: MEDICAL CENTER | Age: 78
End: 2018-06-01

## 2018-06-01 DIAGNOSIS — A15.9 TUBERCULOSIS: ICD-10-CM

## 2018-06-01 NOTE — TELEPHONE ENCOUNTER
Called niko to let her know labs were placed for pt. She will call pt to let them know. Lab are sent via mail as well.

## 2018-06-01 NOTE — TELEPHONE ENCOUNTER
1. Caller Name: Perla ([facility])                                         Call Back Number: [phone]      Patient approves a detailed voicemail message: yes    Perla from [facility] called stating that they will need CMP, HIV Test, and Hepatitis B & C or Hepatitis Panel lab orders placed b/c they are required for her treatment that they are giving her. Please Advise.

## 2018-06-18 ENCOUNTER — HOSPITAL ENCOUNTER (OUTPATIENT)
Dept: LAB | Facility: MEDICAL CENTER | Age: 78
End: 2018-06-18
Attending: INTERNAL MEDICINE
Payer: MEDICARE

## 2018-06-18 DIAGNOSIS — A15.9 TUBERCULOSIS: ICD-10-CM

## 2018-06-18 LAB
ALBUMIN SERPL BCP-MCNC: 4.3 G/DL (ref 3.2–4.9)
ALBUMIN/GLOB SERPL: 1.6 G/DL
ALP SERPL-CCNC: 74 U/L (ref 30–99)
ALT SERPL-CCNC: 27 U/L (ref 2–50)
ANION GAP SERPL CALC-SCNC: 8 MMOL/L (ref 0–11.9)
AST SERPL-CCNC: 32 U/L (ref 12–45)
BILIRUB SERPL-MCNC: 0.3 MG/DL (ref 0.1–1.5)
BUN SERPL-MCNC: 17 MG/DL (ref 8–22)
CALCIUM SERPL-MCNC: 9.9 MG/DL (ref 8.5–10.5)
CHLORIDE SERPL-SCNC: 96 MMOL/L (ref 96–112)
CO2 SERPL-SCNC: 26 MMOL/L (ref 20–33)
CREAT SERPL-MCNC: 0.71 MG/DL (ref 0.5–1.4)
GLOBULIN SER CALC-MCNC: 2.7 G/DL (ref 1.9–3.5)
GLUCOSE SERPL-MCNC: 84 MG/DL (ref 65–99)
HBV SURFACE AG SER QL: NEGATIVE
HCV AB SER QL: NEGATIVE
HIV 1+2 AB+HIV1 P24 AG SERPL QL IA: NON REACTIVE
POTASSIUM SERPL-SCNC: 4.1 MMOL/L (ref 3.6–5.5)
PROT SERPL-MCNC: 7 G/DL (ref 6–8.2)
SODIUM SERPL-SCNC: 130 MMOL/L (ref 135–145)

## 2018-06-18 PROCEDURE — 36415 COLL VENOUS BLD VENIPUNCTURE: CPT

## 2018-06-18 PROCEDURE — 87340 HEPATITIS B SURFACE AG IA: CPT

## 2018-06-18 PROCEDURE — 86803 HEPATITIS C AB TEST: CPT

## 2018-06-18 PROCEDURE — 80053 COMPREHEN METABOLIC PANEL: CPT

## 2018-06-18 PROCEDURE — G0475 HIV COMBINATION ASSAY: HCPCS | Mod: GA

## 2018-06-28 ENCOUNTER — HOSPITAL ENCOUNTER (OUTPATIENT)
Dept: RADIOLOGY | Facility: MEDICAL CENTER | Age: 78
End: 2018-06-28
Attending: INTERNAL MEDICINE
Payer: MEDICARE

## 2018-06-28 DIAGNOSIS — A15.0 PULMONARY TB: ICD-10-CM

## 2018-06-28 PROCEDURE — 71046 X-RAY EXAM CHEST 2 VIEWS: CPT

## 2018-08-01 ENCOUNTER — HOSPITAL ENCOUNTER (OUTPATIENT)
Dept: HOSPITAL 8 - CFH | Age: 78
Discharge: HOME | End: 2018-08-01
Attending: INTERNAL MEDICINE
Payer: MEDICARE

## 2018-08-01 DIAGNOSIS — Z85.3: ICD-10-CM

## 2018-08-01 DIAGNOSIS — E78.5: ICD-10-CM

## 2018-08-01 DIAGNOSIS — I08.0: Primary | ICD-10-CM

## 2018-08-01 DIAGNOSIS — I10: ICD-10-CM

## 2018-08-01 PROCEDURE — 93306 TTE W/DOPPLER COMPLETE: CPT

## 2018-08-09 DIAGNOSIS — J30.1 SEASONAL ALLERGIC RHINITIS DUE TO POLLEN: ICD-10-CM

## 2018-08-09 RX ORDER — FLUTICASONE PROPIONATE 50 MCG
2 SPRAY, SUSPENSION (ML) NASAL DAILY
Qty: 40 G | Refills: 3 | Status: SHIPPED | OUTPATIENT
Start: 2018-08-09 | End: 2019-03-10 | Stop reason: SDUPTHER

## 2018-08-09 NOTE — TELEPHONE ENCOUNTER
PT SEEN: 5/10/18 WITH Dr. Figueroa NEXT APPT None   Was the patient seen in the last year in this department? Yes     Does patient have an active prescription for medications requested? No     Received Request Via: Pharmacy

## 2018-09-05 ENCOUNTER — TELEPHONE (OUTPATIENT)
Dept: INTERNAL MEDICINE | Facility: MEDICAL CENTER | Age: 78
End: 2018-09-05

## 2018-09-05 DIAGNOSIS — J30.1 SEASONAL ALLERGIC RHINITIS DUE TO POLLEN: ICD-10-CM

## 2018-09-05 RX ORDER — MONTELUKAST SODIUM 10 MG/1
10 TABLET ORAL DAILY
Qty: 90 TAB | Refills: 1 | Status: SHIPPED | OUTPATIENT
Start: 2018-09-05 | End: 2019-03-10 | Stop reason: SDUPTHER

## 2018-09-05 NOTE — TELEPHONE ENCOUNTER
Last seen: 05/10/18 by Dr. Figueroa  Next appt: None     Was the patient seen in the last year in this department? Yes   Does patient have an active prescription for medications requested? No   Received Request Via: Pharmacy

## 2018-09-05 NOTE — TELEPHONE ENCOUNTER
Received a fax from [facility] stating that they will continue to hold pts Rx Rosuvastatin until rifampin/inh is fully completed on 11/08/18. Pt has agreed to hold.

## 2018-10-26 ENCOUNTER — HOSPITAL ENCOUNTER (OUTPATIENT)
Dept: RADIOLOGY | Facility: MEDICAL CENTER | Age: 78
End: 2018-10-26
Attending: INTERNAL MEDICINE
Payer: MEDICARE

## 2018-10-26 DIAGNOSIS — A15.0 TUBERCULOSIS OF LUNG, INFILTRATIVE, BACTERIOLOGICAL OR HISTOLOGICAL EXAMINATION NOT DONE: ICD-10-CM

## 2018-10-26 DIAGNOSIS — M81.0 SENILE OSTEOPOROSIS: ICD-10-CM

## 2018-10-26 DIAGNOSIS — R29.890 LOSS OF HEIGHT: ICD-10-CM

## 2018-10-26 PROCEDURE — 71045 X-RAY EXAM CHEST 1 VIEW: CPT

## 2018-10-26 PROCEDURE — 72080 X-RAY EXAM THORACOLMB 2/> VW: CPT

## 2018-12-06 ENCOUNTER — OFFICE VISIT (OUTPATIENT)
Dept: INTERNAL MEDICINE | Facility: MEDICAL CENTER | Age: 78
End: 2018-12-06
Payer: MEDICARE

## 2018-12-06 VITALS
WEIGHT: 117 LBS | HEART RATE: 91 BPM | HEIGHT: 59 IN | OXYGEN SATURATION: 98 % | DIASTOLIC BLOOD PRESSURE: 76 MMHG | TEMPERATURE: 97.7 F | BODY MASS INDEX: 23.59 KG/M2 | SYSTOLIC BLOOD PRESSURE: 126 MMHG

## 2018-12-06 DIAGNOSIS — E78.2 MIXED HYPERLIPIDEMIA: Chronic | ICD-10-CM

## 2018-12-06 DIAGNOSIS — R73.02 GLUCOSE INTOLERANCE (IMPAIRED GLUCOSE TOLERANCE): ICD-10-CM

## 2018-12-06 DIAGNOSIS — Z23 NEED FOR INFLUENZA VACCINATION: ICD-10-CM

## 2018-12-06 DIAGNOSIS — I10 ESSENTIAL HYPERTENSION: ICD-10-CM

## 2018-12-06 DIAGNOSIS — Z12.11 COLON CANCER SCREENING: ICD-10-CM

## 2018-12-06 DIAGNOSIS — A15.9 TUBERCULOSIS: ICD-10-CM

## 2018-12-06 PROCEDURE — 90662 IIV NO PRSV INCREASED AG IM: CPT | Performed by: INTERNAL MEDICINE

## 2018-12-06 PROCEDURE — G0008 ADMIN INFLUENZA VIRUS VAC: HCPCS | Performed by: INTERNAL MEDICINE

## 2018-12-06 PROCEDURE — 99214 OFFICE O/P EST MOD 30 MIN: CPT | Mod: 25 | Performed by: INTERNAL MEDICINE

## 2018-12-06 RX ORDER — ALENDRONATE SODIUM 70 MG/1
70 TABLET ORAL
Refills: 4 | COMMUNITY
Start: 2018-11-19 | End: 2021-11-17

## 2018-12-06 NOTE — NON-PROVIDER
"Leah Cortez is a 78 y.o. female here for a non-provider visit for:   FLU    Reason for immunization: Annual Flu Vaccine  Immunization records indicate need for vaccine: Yes, confirmed with Epic  Minimum interval has been met for this vaccine: Yes  ABN completed: Not Indicated    Order and dose verified by: Emily JEFF Dated  08/07/15 was given to patient: Yes  All IAC Questionnaire questions were answered \"No.\"    Patient tolerated injection and no adverse effects were observed or reported: Yes    Pt scheduled for next dose in series: Not Indicated  "

## 2018-12-06 NOTE — PROGRESS NOTES
date  Chief Complaint   Patient presents with   • Sore Throat     burning feeling on right side of throat   • Medication Refill     Colesterol       HISTORY OF PRESENT ILLNESS: Patient is a 78 y.o. female established patient who presents today for the following.      1. Tuberculosis  Patient [facility] after an active lesion on her chest x-ray.  She has finished her 6 months treatment of rifampin INH along with the ethambutol regimen given by infectious disease specialist.  She states she is doing well now and has felt that she would have been better off if she went to [facility] without isolation.    2. Essential hypertension  Has been taking her blood pressure medication and denies having any headaches at this.    3. Glucose intolerance (impaired glucose tolerance)  Patient had no recent labs in regards to her sugar levels and states that she did not go back on her healthy diet yet secondary to limitations with isolation with no exercise    4. Mixed hyperlipidemia  Has been off of cholesterol medication secondary to risk for interactions with other tuberculosis medications.  Currently has no issues    5. Need for influenza vaccination  Interested in getting flu shot after discussion    6. Colon cancer screening  Patient is not interested in colonoscopy but okay with having stool card testing.  Denies any blood in stools      Past Medical History:   Diagnosis Date   • Abnormal electrocardiogram 5/24/2010   • DDD (degenerative disc disease), cervical    • Dyslipidemia    • Edema 9/21/2011   • Family history of coronary artery disease 4/5/2012   • Foot pain 4/8/2009   • History of breast cancer 4/8/2009   • HTN (hypertension) 9/21/2011   • Hyperlipidemia 9/21/2011   • Insomnia    • Murmur 9/21/2011   • Nonspecific abnormal unspecified cardiovascular function study 4/30/2009   • Osteoporosis    • Vitamin d deficiency 10/18/2012   • Vitiligo        Patient Active Problem  List    Diagnosis Date Noted   • Family history of coronary artery disease 04/05/2012     Priority: Medium   • Hyperlipidemia 09/21/2011     Priority: Medium   • Essential hypertension 09/21/2011     Priority: Medium   • Tuberculosis 12/06/2018   • Seasonal allergic rhinitis due to pollen 05/10/2018   • Urticaria 03/06/2018   • Lentigo 03/06/2018   • Age-related osteoporosis without current pathological fracture 11/15/2017   • Glucose intolerance (impaired glucose tolerance) 04/26/2017   • Vitamin d deficiency 10/18/2012   • Murmur 09/21/2011   • Abnormal electrocardiogram 05/24/2010   • Nonspecific abnormal results of cardiovascular function study 04/30/2009   • History of breast cancer 04/08/2009       Allergies:Patient has no known allergies.    Current Outpatient Prescriptions   Medication Sig Dispense Refill   • alendronate (FOSAMAX) 70 MG Tab Take 70 mg by mouth.  4   • montelukast (SINGULAIR) 10 MG Tab Take 1 Tab by mouth every day. 90 Tab 1   • ofloxacin (OCUFLOX) 0.3 % Solution      • prednisoLONE acetate (PRED FORTE) 1 % Suspension      • lisinopril-hydrochlorothiazide (PRINZIDE, ZESTORETIC) 20-12.5 MG per tablet Take 1 Tab by mouth every day. 90 Tab 3   • FISH OIL by Does not apply route.       • amlodipine (NORVASC) 10 MG TABS Take 10 mg by mouth every day.     • aspirin 81 MG tablet Take  by mouth every day.     • Calcium Carbonate-Vitamin D (CALCIUM + D PO) Take  by mouth every day.     • Cholecalciferol (VITAMIN D) 2000 UNIT TABS Take  by mouth. Take 1 tablet daily.      • fluticasone (FLONASE) 50 MCG/ACT nasal spray SPRAY 2 SPRAYS IN NOSE EVERY DAY. 40 g 3   • rosuvastatin (CRESTOR) 10 MG Tab TAKE 1 TAB BY MOUTH EVERY EVENING. 90 Tab 1     No current facility-administered medications for this visit.        Social History   Substance Use Topics   • Smoking status: Never Smoker   • Smokeless tobacco: Never Used   • Alcohol use No       Family History   Problem Relation Age of Onset   • Stroke Father   "  • Arthritis Mother    • Breast Cancer Sister         X 2         Review of Systems   Patient denies Fevers/chills/nausea/vomiting/chest pain/sob/blood in stools/black stools/blood in urine.all other systems are reviewed See HPI    Exam:  Blood pressure 126/76, pulse 91, temperature 36.5 °C (97.7 °F), temperature source Temporal, height 1.499 m (4' 11\"), weight 53.1 kg (117 lb), SpO2 98 %, not currently breastfeeding. Body mass index is 23.63 kg/m².  Constitutional:  NAD, well appearing.  HEENT:   NC/AT  Cardiovascular: RRR.   No m/r/g. No carotid bruits.       Lungs:   CTAB, no w/r/r, no respiratory distress.  Abdomen: Soft, NT/ND + BS, no masses, no suprapubic tenderness, no hepatomegaly.  Extremities:  2+ DP and radial pulses bilaterally.  No c/c/e.  Skin:  Warm and dry.    Neurologic: Alert & oriented x 3, CN II-XII grossly intact, strength and sensation grossly intact.  No focal deficits noted.  Psychiatric:  Affect normal, mood normal, judgment normal.    Assessment/Plan:     1. Tuberculosis  Patient finished her treatment and doing well without any new concerns.  Check labs  - CBC WITH DIFFERENTIAL; Future    2. Essential hypertension  Currently blood pressure stable on her current regimen of amlodipine, lisinopril hydrochlorothiazide.  Continue the same along with low-salt diet and exercise and no change  - COMP METABOLIC PANEL; Future    3. Glucose intolerance (impaired glucose tolerance)  A1c in the past was 6.3 and continue diet and exercise with follow-up labs  - COMP METABOLIC PANEL; Future  - HEMOGLOBIN A1C; Future    4. Mixed hyperlipidemia  For now hold off on statin recheck labs and then reassess the need for statin  - Lipid Profile; Future    5. Need for influenza vaccination  Patient given flu shot today  - INFLUENZA VACCINE, HIGH DOSE (65+ ONLY)    6. Colon cancer screening  Check for test  - COLOGUARD (FIT DNA)      All imaging results and lab results and consult notes are reviewed at this " visit.  Followup: Return in about 3 months (around 3/6/2019) for Long- medicare wellness.    Please note that this dictation was created using voice recognition software. I have made every reasonable attempt to correct obvious errors, but I expect that there are errors of grammar and possibly content that I did not discover before finalizing the note.

## 2019-01-05 ENCOUNTER — HOSPITAL ENCOUNTER (OUTPATIENT)
Dept: LAB | Facility: MEDICAL CENTER | Age: 79
End: 2019-01-05
Attending: INTERNAL MEDICINE
Payer: MEDICARE

## 2019-01-05 DIAGNOSIS — A15.9 TUBERCULOSIS: ICD-10-CM

## 2019-01-05 DIAGNOSIS — I10 ESSENTIAL HYPERTENSION: ICD-10-CM

## 2019-01-05 DIAGNOSIS — E78.2 MIXED HYPERLIPIDEMIA: Chronic | ICD-10-CM

## 2019-01-05 DIAGNOSIS — R73.02 GLUCOSE INTOLERANCE (IMPAIRED GLUCOSE TOLERANCE): ICD-10-CM

## 2019-01-05 LAB
ALBUMIN SERPL BCP-MCNC: 4.5 G/DL (ref 3.2–4.9)
ALBUMIN/GLOB SERPL: 1.7 G/DL
ALP SERPL-CCNC: 57 U/L (ref 30–99)
ALT SERPL-CCNC: 15 U/L (ref 2–50)
ANION GAP SERPL CALC-SCNC: 9 MMOL/L (ref 0–11.9)
AST SERPL-CCNC: 29 U/L (ref 12–45)
BASOPHILS # BLD AUTO: 1.5 % (ref 0–1.8)
BASOPHILS # BLD: 0.06 K/UL (ref 0–0.12)
BILIRUB SERPL-MCNC: 0.6 MG/DL (ref 0.1–1.5)
BUN SERPL-MCNC: 16 MG/DL (ref 8–22)
CALCIUM SERPL-MCNC: 9.7 MG/DL (ref 8.5–10.5)
CHLORIDE SERPL-SCNC: 102 MMOL/L (ref 96–112)
CHOLEST SERPL-MCNC: 286 MG/DL (ref 100–199)
CO2 SERPL-SCNC: 25 MMOL/L (ref 20–33)
CREAT SERPL-MCNC: 0.84 MG/DL (ref 0.5–1.4)
EOSINOPHIL # BLD AUTO: 0.17 K/UL (ref 0–0.51)
EOSINOPHIL NFR BLD: 4.3 % (ref 0–6.9)
ERYTHROCYTE [DISTWIDTH] IN BLOOD BY AUTOMATED COUNT: 46.6 FL (ref 35.9–50)
EST. AVERAGE GLUCOSE BLD GHB EST-MCNC: 123 MG/DL
FASTING STATUS PATIENT QL REPORTED: NORMAL
GLOBULIN SER CALC-MCNC: 2.7 G/DL (ref 1.9–3.5)
GLUCOSE SERPL-MCNC: 94 MG/DL (ref 65–99)
HBA1C MFR BLD: 5.9 % (ref 0–5.6)
HCT VFR BLD AUTO: 43.5 % (ref 37–47)
HDLC SERPL-MCNC: 76 MG/DL
HGB BLD-MCNC: 14.6 G/DL (ref 12–16)
IMM GRANULOCYTES # BLD AUTO: 0.01 K/UL (ref 0–0.11)
IMM GRANULOCYTES NFR BLD AUTO: 0.3 % (ref 0–0.9)
LDLC SERPL CALC-MCNC: 190 MG/DL
LYMPHOCYTES # BLD AUTO: 1.43 K/UL (ref 1–4.8)
LYMPHOCYTES NFR BLD: 36.3 % (ref 22–41)
MCH RBC QN AUTO: 31 PG (ref 27–33)
MCHC RBC AUTO-ENTMCNC: 33.6 G/DL (ref 33.6–35)
MCV RBC AUTO: 92.4 FL (ref 81.4–97.8)
MONOCYTES # BLD AUTO: 0.32 K/UL (ref 0–0.85)
MONOCYTES NFR BLD AUTO: 8.1 % (ref 0–13.4)
NEUTROPHILS # BLD AUTO: 1.95 K/UL (ref 2–7.15)
NEUTROPHILS NFR BLD: 49.5 % (ref 44–72)
NRBC # BLD AUTO: 0 K/UL
NRBC BLD-RTO: 0 /100 WBC
PLATELET # BLD AUTO: 228 K/UL (ref 164–446)
PMV BLD AUTO: 10.7 FL (ref 9–12.9)
POTASSIUM SERPL-SCNC: 4.2 MMOL/L (ref 3.6–5.5)
PROT SERPL-MCNC: 7.2 G/DL (ref 6–8.2)
RBC # BLD AUTO: 4.71 M/UL (ref 4.2–5.4)
SODIUM SERPL-SCNC: 136 MMOL/L (ref 135–145)
TRIGL SERPL-MCNC: 102 MG/DL (ref 0–149)
WBC # BLD AUTO: 3.9 K/UL (ref 4.8–10.8)

## 2019-01-05 PROCEDURE — 83036 HEMOGLOBIN GLYCOSYLATED A1C: CPT | Mod: GA

## 2019-01-05 PROCEDURE — 85025 COMPLETE CBC W/AUTO DIFF WBC: CPT

## 2019-01-05 PROCEDURE — 80053 COMPREHEN METABOLIC PANEL: CPT

## 2019-01-05 PROCEDURE — 80061 LIPID PANEL: CPT

## 2019-01-05 PROCEDURE — 36415 COLL VENOUS BLD VENIPUNCTURE: CPT

## 2019-01-08 DIAGNOSIS — E78.2 MIXED HYPERLIPIDEMIA: Chronic | ICD-10-CM

## 2019-01-08 RX ORDER — ROSUVASTATIN CALCIUM 10 MG/1
10 TABLET, COATED ORAL EVERY EVENING
Qty: 90 TAB | Refills: 1 | Status: SHIPPED | OUTPATIENT
Start: 2019-01-08 | End: 2021-11-17

## 2019-01-15 DIAGNOSIS — R19.5 STOOL GUAIAC POSITIVE: ICD-10-CM

## 2019-03-06 ENCOUNTER — OFFICE VISIT (OUTPATIENT)
Dept: INTERNAL MEDICINE | Facility: MEDICAL CENTER | Age: 79
End: 2019-03-06
Payer: MEDICARE

## 2019-03-06 VITALS
OXYGEN SATURATION: 97 % | SYSTOLIC BLOOD PRESSURE: 130 MMHG | HEART RATE: 77 BPM | BODY MASS INDEX: 23.93 KG/M2 | HEIGHT: 58 IN | DIASTOLIC BLOOD PRESSURE: 70 MMHG | WEIGHT: 114 LBS | TEMPERATURE: 97.5 F

## 2019-03-06 DIAGNOSIS — E78.2 MIXED HYPERLIPIDEMIA: Chronic | ICD-10-CM

## 2019-03-06 DIAGNOSIS — Z00.00 ENCOUNTER FOR MEDICARE ANNUAL WELLNESS EXAM: ICD-10-CM

## 2019-03-06 DIAGNOSIS — Z86.19 HISTORY OF SHINGLES: ICD-10-CM

## 2019-03-06 DIAGNOSIS — R19.5 OCCULT BLOOD POSITIVE STOOL: ICD-10-CM

## 2019-03-06 DIAGNOSIS — M81.0 AGE-RELATED OSTEOPOROSIS WITHOUT CURRENT PATHOLOGICAL FRACTURE: ICD-10-CM

## 2019-03-06 DIAGNOSIS — R73.02 GLUCOSE INTOLERANCE (IMPAIRED GLUCOSE TOLERANCE): ICD-10-CM

## 2019-03-06 DIAGNOSIS — I10 ESSENTIAL HYPERTENSION: ICD-10-CM

## 2019-03-06 DIAGNOSIS — Z12.39 BREAST CANCER SCREENING: ICD-10-CM

## 2019-03-06 PROCEDURE — G0439 PPPS, SUBSEQ VISIT: HCPCS | Performed by: INTERNAL MEDICINE

## 2019-03-06 RX ORDER — VALACYCLOVIR HYDROCHLORIDE 1 G/1
1000 TABLET, FILM COATED ORAL
Refills: 0 | COMMUNITY
Start: 2019-01-23 | End: 2019-03-06

## 2019-03-06 ASSESSMENT — PATIENT HEALTH QUESTIONNAIRE - PHQ9: CLINICAL INTERPRETATION OF PHQ2 SCORE: 0

## 2019-03-06 ASSESSMENT — ENCOUNTER SYMPTOMS: GENERAL WELL-BEING: GOOD

## 2019-03-06 ASSESSMENT — ACTIVITIES OF DAILY LIVING (ADL): BATHING_REQUIRES_ASSISTANCE: 0

## 2019-03-06 NOTE — PROGRESS NOTES
HPI:  Leah is a 79 y.o. here for Medicare Annual Wellness Visit . Lives with Daughter and family. One grandson. Has friends around. Reads a lot and crossword puzzles. Helps cooking for the family. Finished Her TB treatment later had Shingles outbreak and given Valtrex x 1 wk at an urgent care. Currently no concerns has some numbness in her hands and plan to get her pyridoxine again from [employer].  Taking all her meds as prescribed and planning to go to [facility] in August for 2 wks.  Has cardiology Appt coming up and Labs for Dr. Bains to be done this month.    Patient Active Problem List    Diagnosis Date Noted   • Family history of coronary artery disease 04/05/2012     Priority: Medium   • Hyperlipidemia 09/21/2011     Priority: Medium   • Essential hypertension 09/21/2011     Priority: Medium   • Tuberculosis 12/06/2018   • Seasonal allergic rhinitis due to pollen 05/10/2018   • Urticaria 03/06/2018   • Lentigo 03/06/2018   • Age-related osteoporosis without current pathological fracture 11/15/2017   • Glucose intolerance (impaired glucose tolerance) 04/26/2017   • Vitamin d deficiency 10/18/2012   • Murmur 09/21/2011   • Abnormal electrocardiogram 05/24/2010   • Nonspecific abnormal results of cardiovascular function study 04/30/2009   • History of breast cancer 04/08/2009       Current Outpatient Prescriptions   Medication Sig Dispense Refill   • rosuvastatin (CRESTOR) 10 MG Tab Take 1 Tab by mouth every evening. 90 Tab 1   • alendronate (FOSAMAX) 70 MG Tab Take 70 mg by mouth.  4   • montelukast (SINGULAIR) 10 MG Tab Take 1 Tab by mouth every day. 90 Tab 1   • fluticasone (FLONASE) 50 MCG/ACT nasal spray SPRAY 2 SPRAYS IN NOSE EVERY DAY. 40 g 3   • lisinopril-hydrochlorothiazide (PRINZIDE, ZESTORETIC) 20-12.5 MG per tablet Take 1 Tab by mouth every day. 90 Tab 3   • FISH OIL by Does not apply route.       • amlodipine (NORVASC) 10 MG TABS Take 10 mg by mouth every day.     • aspirin 81 MG tablet Take  by  mouth every day.     • Calcium Carbonate-Vitamin D (CALCIUM + D PO) Take  by mouth every day.     • Cholecalciferol (VITAMIN D) 2000 UNIT TABS Take  by mouth. Take 1 tablet daily.        No current facility-administered medications for this visit.             Current supplements as per medication list.       Allergies: Patient has no known allergies.    Immunizations: Needs Tdap and Shingrix.    Current social contact/activities: parties from [employer] groups every month.goes to [employer] every day.     She  reports that she has never smoked. She has never used smokeless tobacco. She reports that she does not drink alcohol or use drugs.  Counseling given: Not Answered      DPA/Advanced directive: Patient does not have an advanced directive.  If no advanced directive exists, a packet and workshop information was given on advanced directives.     ROS:    Gait: Uses no assistive device   Ostomy: no   Other tubes: no   Amputations: no   Chronic oxygen use no   Last eye exam last month   : Denies incontinence.       Screening:    Depression Screening    Little interest or pleasure in doing things?  0 - not at all  Feeling down, depressed , or hopeless? 0 - not at all  Patient Health Questionnaire Score: 0     If depressive symptoms identified deferred to follow up visit unless specifically addressed in assessment and plan.    Interpretation of PHQ-9 Total Score   Score Severity   1-4 No Depression   5-9 Mild Depression   10-14 Moderate Depression   15-19 Moderately Severe Depression   20-27 Severe Depression    Screening for Cognitive Impairment    Three Minute Recall (leader, season, table) 3/3    Hubert clock face with all 12 numbers and set the hands to show 10 past 11.  Yes    Cognitive concerns identified deferred for follow up unless specifically addressed in assessment and plan.    Fall Risk Assessment    Has the patient had two or more falls in the last year or any fall with injury in the last year?  No    Safety  Assessment    Throw rugs on floor.  No  Handrails on all stairs.  Yes  Good lighting in all hallways.  Yes  Difficulty hearing.  Yes  Patient counseled about all safety risks that were identified.    Functional Assessment ADLs    Are there any barriers preventing you from cooking for yourself or meeting nutritional needs?  No.    Are there any barriers preventing you from driving safely or obtaining transportation?  Yes.    Are there any barriers preventing you from using a telephone or calling for help?  No.    Are there any barriers preventing you from shopping?  No.    Are there any barriers preventing you from taking care of your own finances?  Yes.    Are there any barriers preventing you from managing your medications?  No.    Are there any barriers preventing you from showering, bathing or dressing yourself?  No.    Are you currently engaging in any exercise or physical activity?  Yes.     What is your perception of your health?  Good.      Health Maintenance Summary                IMM DTaP/Tdap/Td Vaccine Overdue 1/9/1959     COLONOSCOPY Overdue 1/9/1990     IMM ZOSTER VACCINES Overdue 1/9/1990     Annual Wellness Visit Overdue 2/4/2018      Done 2/3/2017 Visit Dx: Medicare annual wellness visit, initial    MAMMOGRAM Next Due 4/4/2019      Done 4/4/2018 MA-MAMMO SCREENING BILAT W/TOMOSYNTHESIS W/O CAD     Patient has more history with this topic...    BONE DENSITY Next Due 12/14/2022      Done 12/14/2017 DS-BONE DENSITY STUDY (DEXA)     Patient has more history with this topic...          Patient Care Team:  Luz Marina Figueroa M.D. as PCP - General  Kamilla Bains M.D. as Consulting Physician (Cardiology)        Social History   Substance Use Topics   • Smoking status: Never Smoker   • Smokeless tobacco: Never Used   • Alcohol use No     Family History   Problem Relation Age of Onset   • Stroke Father    • Arthritis Mother    • Breast Cancer Sister         X 2     She  has a past medical history of Abnormal  "electrocardiogram (5/24/2010); DDD (degenerative disc disease), cervical; Dyslipidemia; Edema (9/21/2011); Family history of coronary artery disease (4/5/2012); Foot pain (4/8/2009); History of breast cancer (4/8/2009); HTN (hypertension) (9/21/2011); Hyperlipidemia (9/21/2011); Insomnia; Murmur (9/21/2011); Nonspecific abnormal unspecified cardiovascular function study (4/30/2009); Osteoporosis; Vitamin d deficiency (10/18/2012); and Vitiligo.   Past Surgical History:   Procedure Laterality Date   • CYST EXCISION      LUNG   • HYSTERECTOMY, TOTAL ABDOMINAL     • OTHER      BREAST SURGERY LUMPECTOMY.   • OTHER      PACEMAKER       Exam:     Blood pressure 130/70, pulse 77, temperature 36.4 °C (97.5 °F), temperature source Temporal, height 1.476 m (4' 10.11\"), weight 51.7 kg (114 lb), SpO2 97 %, not currently breastfeeding. Body mass index is 23.74 kg/m².    Hearing good.    Dentition good  Alert, oriented in no acute distress.  Eye contact is good, speech goal directed, affect calm      Assessment and Plan. The following treatment and monitoring plan is recommended:    1. Encounter for Medicare annual wellness exam     2. History of shingles     3. Glucose intolerance (impaired glucose tolerance)     4. Essential hypertension     5. Age-related osteoporosis without current pathological fracture     6. Mixed hyperlipidemia     7. Occult blood positive stool           Services needed: No services needed at this time  Health Care Screening recommendations as per orders if indicated.  Referrals offered: PT/OT/Nutrition counseling/Behavioral Health/Smoking cessation as per orders if indicated.    Discussion today about general wellness and lifestyle habits:    · Prevent falls and reduce trip hazards; Cautioned about securing or removing rugs.  · Have a working fire alarm and carbon monoxide detector;   · Engage in regular physical activity and social activities       Follow-up: Return in about 6 months (around " 9/5/2019).

## 2019-03-10 DIAGNOSIS — J30.1 SEASONAL ALLERGIC RHINITIS DUE TO POLLEN: ICD-10-CM

## 2019-03-11 RX ORDER — FLUTICASONE PROPIONATE 50 MCG
SPRAY, SUSPENSION (ML) NASAL
Qty: 3 BOTTLE | Refills: 2 | Status: SHIPPED | OUTPATIENT
Start: 2019-03-11 | End: 2022-01-25 | Stop reason: SDUPTHER

## 2019-03-11 RX ORDER — MONTELUKAST SODIUM 10 MG/1
TABLET ORAL
Qty: 90 TAB | Refills: 2 | Status: SHIPPED | OUTPATIENT
Start: 2019-03-11 | End: 2021-11-17

## 2019-03-11 NOTE — TELEPHONE ENCOUNTER
Last seen: 03/06/19 by Dr. Figueroa  Next appt: 09/11/19 with Dr. Figueroa    Was the patient seen in the last year in this department? Yes   Does patient have an active prescription for medications requested? No   Received Request Via: Pharmacy

## 2019-04-03 ENCOUNTER — HOSPITAL ENCOUNTER (OUTPATIENT)
Dept: LAB | Facility: MEDICAL CENTER | Age: 79
End: 2019-04-03
Attending: INTERNAL MEDICINE
Payer: MEDICARE

## 2019-04-03 LAB
ALBUMIN SERPL BCP-MCNC: 4.4 G/DL (ref 3.2–4.9)
ALBUMIN/GLOB SERPL: 1.9 G/DL
ALP SERPL-CCNC: 51 U/L (ref 30–99)
ALT SERPL-CCNC: 16 U/L (ref 2–50)
ANION GAP SERPL CALC-SCNC: 6 MMOL/L (ref 0–11.9)
AST SERPL-CCNC: 27 U/L (ref 12–45)
BASOPHILS # BLD AUTO: 0.9 % (ref 0–1.8)
BASOPHILS # BLD: 0.04 K/UL (ref 0–0.12)
BILIRUB SERPL-MCNC: 0.6 MG/DL (ref 0.1–1.5)
BUN SERPL-MCNC: 17 MG/DL (ref 8–22)
CALCIUM SERPL-MCNC: 9.6 MG/DL (ref 8.5–10.5)
CHLORIDE SERPL-SCNC: 104 MMOL/L (ref 96–112)
CHOLEST SERPL-MCNC: 171 MG/DL (ref 100–199)
CO2 SERPL-SCNC: 28 MMOL/L (ref 20–33)
CREAT SERPL-MCNC: 0.72 MG/DL (ref 0.5–1.4)
EOSINOPHIL # BLD AUTO: 0.17 K/UL (ref 0–0.51)
EOSINOPHIL NFR BLD: 3.9 % (ref 0–6.9)
ERYTHROCYTE [DISTWIDTH] IN BLOOD BY AUTOMATED COUNT: 52.6 FL (ref 35.9–50)
FASTING STATUS PATIENT QL REPORTED: NORMAL
GLOBULIN SER CALC-MCNC: 2.3 G/DL (ref 1.9–3.5)
GLUCOSE SERPL-MCNC: 89 MG/DL (ref 65–99)
HCT VFR BLD AUTO: 42.9 % (ref 37–47)
HDLC SERPL-MCNC: 81 MG/DL
HGB BLD-MCNC: 14.1 G/DL (ref 12–16)
IMM GRANULOCYTES # BLD AUTO: 0.02 K/UL (ref 0–0.11)
IMM GRANULOCYTES NFR BLD AUTO: 0.5 % (ref 0–0.9)
LDLC SERPL CALC-MCNC: 77 MG/DL
LYMPHOCYTES # BLD AUTO: 1.27 K/UL (ref 1–4.8)
LYMPHOCYTES NFR BLD: 29 % (ref 22–41)
MCH RBC QN AUTO: 31.5 PG (ref 27–33)
MCHC RBC AUTO-ENTMCNC: 32.9 G/DL (ref 33.6–35)
MCV RBC AUTO: 95.8 FL (ref 81.4–97.8)
MONOCYTES # BLD AUTO: 0.37 K/UL (ref 0–0.85)
MONOCYTES NFR BLD AUTO: 8.4 % (ref 0–13.4)
NEUTROPHILS # BLD AUTO: 2.51 K/UL (ref 2–7.15)
NEUTROPHILS NFR BLD: 57.3 % (ref 44–72)
NRBC # BLD AUTO: 0 K/UL
NRBC BLD-RTO: 0 /100 WBC
PLATELET # BLD AUTO: 218 K/UL (ref 164–446)
PMV BLD AUTO: 11 FL (ref 9–12.9)
POTASSIUM SERPL-SCNC: 3.9 MMOL/L (ref 3.6–5.5)
PROT SERPL-MCNC: 6.7 G/DL (ref 6–8.2)
RBC # BLD AUTO: 4.48 M/UL (ref 4.2–5.4)
SODIUM SERPL-SCNC: 138 MMOL/L (ref 135–145)
TRIGL SERPL-MCNC: 65 MG/DL (ref 0–149)
WBC # BLD AUTO: 4.4 K/UL (ref 4.8–10.8)

## 2019-04-03 PROCEDURE — 80061 LIPID PANEL: CPT

## 2019-04-03 PROCEDURE — 36415 COLL VENOUS BLD VENIPUNCTURE: CPT

## 2019-04-03 PROCEDURE — 80053 COMPREHEN METABOLIC PANEL: CPT

## 2019-04-03 PROCEDURE — 85025 COMPLETE CBC W/AUTO DIFF WBC: CPT

## 2019-04-10 ENCOUNTER — HOSPITAL ENCOUNTER (OUTPATIENT)
Dept: RADIOLOGY | Facility: MEDICAL CENTER | Age: 79
End: 2019-04-10
Attending: INTERNAL MEDICINE
Payer: MEDICARE

## 2019-04-10 DIAGNOSIS — Z12.39 BREAST CANCER SCREENING: ICD-10-CM

## 2019-04-10 PROCEDURE — 77063 BREAST TOMOSYNTHESIS BI: CPT

## 2019-05-02 ENCOUNTER — HOSPITAL ENCOUNTER (OUTPATIENT)
Dept: LAB | Facility: MEDICAL CENTER | Age: 79
End: 2019-05-02
Attending: INTERNAL MEDICINE
Payer: MEDICARE

## 2019-05-02 LAB
25(OH)D3 SERPL-MCNC: 45 NG/ML (ref 30–100)
CALCIUM SERPL-MCNC: 9.6 MG/DL (ref 8.5–10.5)
CREAT SERPL-MCNC: 0.72 MG/DL (ref 0.5–1.4)
PTH-INTACT SERPL-MCNC: 29.6 PG/ML (ref 14–72)

## 2019-05-02 PROCEDURE — 82565 ASSAY OF CREATININE: CPT

## 2019-05-02 PROCEDURE — 36415 COLL VENOUS BLD VENIPUNCTURE: CPT

## 2019-05-02 PROCEDURE — 82306 VITAMIN D 25 HYDROXY: CPT

## 2019-05-02 PROCEDURE — 83970 ASSAY OF PARATHORMONE: CPT

## 2019-09-21 ENCOUNTER — HOSPITAL ENCOUNTER (OUTPATIENT)
Dept: LAB | Facility: MEDICAL CENTER | Age: 79
End: 2019-09-21
Attending: INTERNAL MEDICINE
Payer: MEDICARE

## 2019-09-21 LAB
ALBUMIN SERPL BCP-MCNC: 4.4 G/DL (ref 3.2–4.9)
ALBUMIN/GLOB SERPL: 1.6 G/DL
ALP SERPL-CCNC: 45 U/L (ref 30–99)
ALT SERPL-CCNC: 13 U/L (ref 2–50)
ANION GAP SERPL CALC-SCNC: 10 MMOL/L (ref 0–11.9)
AST SERPL-CCNC: 25 U/L (ref 12–45)
BILIRUB SERPL-MCNC: 0.8 MG/DL (ref 0.1–1.5)
BUN SERPL-MCNC: 13 MG/DL (ref 8–22)
CALCIUM SERPL-MCNC: 9.6 MG/DL (ref 8.5–10.5)
CHLORIDE SERPL-SCNC: 107 MMOL/L (ref 96–112)
CHOLEST SERPL-MCNC: 166 MG/DL (ref 100–199)
CO2 SERPL-SCNC: 25 MMOL/L (ref 20–33)
CREAT SERPL-MCNC: 0.76 MG/DL (ref 0.5–1.4)
GLOBULIN SER CALC-MCNC: 2.7 G/DL (ref 1.9–3.5)
GLUCOSE SERPL-MCNC: 84 MG/DL (ref 65–99)
HDLC SERPL-MCNC: 67 MG/DL
LDLC SERPL CALC-MCNC: 81 MG/DL
POTASSIUM SERPL-SCNC: 4.1 MMOL/L (ref 3.6–5.5)
PROT SERPL-MCNC: 7.1 G/DL (ref 6–8.2)
SODIUM SERPL-SCNC: 142 MMOL/L (ref 135–145)
TRIGL SERPL-MCNC: 90 MG/DL (ref 0–149)

## 2019-09-21 PROCEDURE — 80061 LIPID PANEL: CPT

## 2019-09-21 PROCEDURE — 80053 COMPREHEN METABOLIC PANEL: CPT

## 2019-09-21 PROCEDURE — 36415 COLL VENOUS BLD VENIPUNCTURE: CPT

## 2020-01-06 ENCOUNTER — HOSPITAL ENCOUNTER (OUTPATIENT)
Dept: LAB | Facility: MEDICAL CENTER | Age: 80
End: 2020-01-06
Attending: INTERNAL MEDICINE
Payer: MEDICARE

## 2020-01-06 LAB
ALBUMIN SERPL BCP-MCNC: 4.1 G/DL (ref 3.2–4.9)
ALBUMIN/GLOB SERPL: 1.4 G/DL
ALP SERPL-CCNC: 51 U/L (ref 30–99)
ALT SERPL-CCNC: 15 U/L (ref 2–50)
ANION GAP SERPL CALC-SCNC: 6 MMOL/L (ref 0–11.9)
AST SERPL-CCNC: 26 U/L (ref 12–45)
BILIRUB SERPL-MCNC: 0.6 MG/DL (ref 0.1–1.5)
BUN SERPL-MCNC: 16 MG/DL (ref 8–22)
CALCIUM SERPL-MCNC: 9.3 MG/DL (ref 8.5–10.5)
CHLORIDE SERPL-SCNC: 106 MMOL/L (ref 96–112)
CHOLEST SERPL-MCNC: 144 MG/DL (ref 100–199)
CO2 SERPL-SCNC: 27 MMOL/L (ref 20–33)
CREAT SERPL-MCNC: 0.77 MG/DL (ref 0.5–1.4)
EST. AVERAGE GLUCOSE BLD GHB EST-MCNC: 131 MG/DL
FASTING STATUS PATIENT QL REPORTED: NORMAL
GLOBULIN SER CALC-MCNC: 3 G/DL (ref 1.9–3.5)
GLUCOSE SERPL-MCNC: 86 MG/DL (ref 65–99)
HBA1C MFR BLD: 6.2 % (ref 0–5.6)
HDLC SERPL-MCNC: 71 MG/DL
LDLC SERPL CALC-MCNC: 60 MG/DL
POTASSIUM SERPL-SCNC: 3.9 MMOL/L (ref 3.6–5.5)
PROT SERPL-MCNC: 7.1 G/DL (ref 6–8.2)
SODIUM SERPL-SCNC: 139 MMOL/L (ref 135–145)
TRIGL SERPL-MCNC: 64 MG/DL (ref 0–149)

## 2020-01-06 PROCEDURE — 80061 LIPID PANEL: CPT

## 2020-01-06 PROCEDURE — 80053 COMPREHEN METABOLIC PANEL: CPT

## 2020-01-06 PROCEDURE — 36415 COLL VENOUS BLD VENIPUNCTURE: CPT | Mod: GA

## 2020-01-06 PROCEDURE — 83036 HEMOGLOBIN GLYCOSYLATED A1C: CPT | Mod: GA

## 2020-08-03 ENCOUNTER — HOSPITAL ENCOUNTER (OUTPATIENT)
Dept: LAB | Facility: MEDICAL CENTER | Age: 80
End: 2020-08-03
Attending: INTERNAL MEDICINE
Payer: MEDICARE

## 2020-08-03 LAB
ALBUMIN SERPL BCP-MCNC: 4.3 G/DL (ref 3.2–4.9)
ALBUMIN/GLOB SERPL: 1.7 G/DL
ALP SERPL-CCNC: 52 U/L (ref 30–99)
ALT SERPL-CCNC: 18 U/L (ref 2–50)
ANION GAP SERPL CALC-SCNC: 11 MMOL/L (ref 7–16)
AST SERPL-CCNC: 26 U/L (ref 12–45)
BASOPHILS # BLD AUTO: 1.5 % (ref 0–1.8)
BASOPHILS # BLD: 0.08 K/UL (ref 0–0.12)
BILIRUB SERPL-MCNC: 0.6 MG/DL (ref 0.1–1.5)
BUN SERPL-MCNC: 18 MG/DL (ref 8–22)
CALCIUM SERPL-MCNC: 9.4 MG/DL (ref 8.5–10.5)
CHLORIDE SERPL-SCNC: 104 MMOL/L (ref 96–112)
CHOLEST SERPL-MCNC: 163 MG/DL (ref 100–199)
CO2 SERPL-SCNC: 25 MMOL/L (ref 20–33)
CREAT SERPL-MCNC: 0.8 MG/DL (ref 0.5–1.4)
EOSINOPHIL # BLD AUTO: 0.28 K/UL (ref 0–0.51)
EOSINOPHIL NFR BLD: 5.1 % (ref 0–6.9)
ERYTHROCYTE [DISTWIDTH] IN BLOOD BY AUTOMATED COUNT: 51 FL (ref 35.9–50)
EST. AVERAGE GLUCOSE BLD GHB EST-MCNC: 134 MG/DL
FASTING STATUS PATIENT QL REPORTED: NORMAL
GLOBULIN SER CALC-MCNC: 2.6 G/DL (ref 1.9–3.5)
GLUCOSE SERPL-MCNC: 102 MG/DL (ref 65–99)
HBA1C MFR BLD: 6.3 % (ref 0–5.6)
HCT VFR BLD AUTO: 44.4 % (ref 37–47)
HDLC SERPL-MCNC: 72 MG/DL
HGB BLD-MCNC: 14.3 G/DL (ref 12–16)
IMM GRANULOCYTES # BLD AUTO: 0.01 K/UL (ref 0–0.11)
IMM GRANULOCYTES NFR BLD AUTO: 0.2 % (ref 0–0.9)
LDLC SERPL CALC-MCNC: 77 MG/DL
LYMPHOCYTES # BLD AUTO: 1.17 K/UL (ref 1–4.8)
LYMPHOCYTES NFR BLD: 21.3 % (ref 22–41)
MCH RBC QN AUTO: 30 PG (ref 27–33)
MCHC RBC AUTO-ENTMCNC: 32.2 G/DL (ref 33.6–35)
MCV RBC AUTO: 93.3 FL (ref 81.4–97.8)
MONOCYTES # BLD AUTO: 0.33 K/UL (ref 0–0.85)
MONOCYTES NFR BLD AUTO: 6 % (ref 0–13.4)
NEUTROPHILS # BLD AUTO: 3.62 K/UL (ref 2–7.15)
NEUTROPHILS NFR BLD: 65.9 % (ref 44–72)
NRBC # BLD AUTO: 0 K/UL
NRBC BLD-RTO: 0 /100 WBC
PLATELET # BLD AUTO: 195 K/UL (ref 164–446)
PMV BLD AUTO: 10.3 FL (ref 9–12.9)
POTASSIUM SERPL-SCNC: 4.5 MMOL/L (ref 3.6–5.5)
PROT SERPL-MCNC: 6.9 G/DL (ref 6–8.2)
RBC # BLD AUTO: 4.76 M/UL (ref 4.2–5.4)
SODIUM SERPL-SCNC: 140 MMOL/L (ref 135–145)
TRIGL SERPL-MCNC: 71 MG/DL (ref 0–149)
WBC # BLD AUTO: 5.5 K/UL (ref 4.8–10.8)

## 2020-08-03 PROCEDURE — 85025 COMPLETE CBC W/AUTO DIFF WBC: CPT

## 2020-08-03 PROCEDURE — 80061 LIPID PANEL: CPT

## 2020-08-03 PROCEDURE — 80053 COMPREHEN METABOLIC PANEL: CPT

## 2020-08-03 PROCEDURE — 83036 HEMOGLOBIN GLYCOSYLATED A1C: CPT | Mod: GA

## 2020-08-03 PROCEDURE — 36415 COLL VENOUS BLD VENIPUNCTURE: CPT | Mod: GA

## 2021-01-14 DIAGNOSIS — Z23 NEED FOR VACCINATION: ICD-10-CM

## 2021-04-01 RX ORDER — SODIUM CHLORIDE 9 MG/ML
INJECTION, SOLUTION INTRAVENOUS CONTINUOUS
Status: CANCELLED | OUTPATIENT
Start: 2021-04-02

## 2021-04-01 RX ORDER — ZOLEDRONIC ACID 5 MG/100ML
5 INJECTION, SOLUTION INTRAVENOUS ONCE
Status: CANCELLED | OUTPATIENT
Start: 2021-04-02 | End: 2021-04-02

## 2021-11-17 ENCOUNTER — OFFICE VISIT (OUTPATIENT)
Dept: INTERNAL MEDICINE | Facility: OTHER | Age: 81
End: 2021-11-17
Payer: MEDICARE

## 2021-11-17 VITALS
HEIGHT: 58 IN | SYSTOLIC BLOOD PRESSURE: 140 MMHG | HEART RATE: 64 BPM | DIASTOLIC BLOOD PRESSURE: 90 MMHG | WEIGHT: 109.8 LBS | OXYGEN SATURATION: 97 % | BODY MASS INDEX: 23.05 KG/M2 | TEMPERATURE: 97.8 F

## 2021-11-17 DIAGNOSIS — Z23 NEED FOR VACCINATION: ICD-10-CM

## 2021-11-17 DIAGNOSIS — E74.39 GLUCOSE INTOLERANCE: ICD-10-CM

## 2021-11-17 DIAGNOSIS — E78.5 DYSLIPIDEMIA: ICD-10-CM

## 2021-11-17 DIAGNOSIS — K21.9 GASTROESOPHAGEAL REFLUX DISEASE, UNSPECIFIED WHETHER ESOPHAGITIS PRESENT: ICD-10-CM

## 2021-11-17 DIAGNOSIS — I10 ESSENTIAL HYPERTENSION: ICD-10-CM

## 2021-11-17 DIAGNOSIS — M81.0 AGE-RELATED OSTEOPOROSIS WITHOUT CURRENT PATHOLOGICAL FRACTURE: ICD-10-CM

## 2021-11-17 PROBLEM — F41.1 GENERALIZED ANXIETY DISORDER: Status: ACTIVE | Noted: 2020-09-10

## 2021-11-17 PROBLEM — M41.9 SCOLIOSIS: Status: ACTIVE | Noted: 2018-11-19

## 2021-11-17 PROBLEM — A15.9 TUBERCULOSIS: Status: RESOLVED | Noted: 2018-12-06 | Resolved: 2021-11-17

## 2021-11-17 PROBLEM — H04.123 DRY EYES: Status: ACTIVE | Noted: 2020-09-10

## 2021-11-17 PROBLEM — Z86.11 PERSONAL HISTORY OF TUBERCULOSIS: Status: ACTIVE | Noted: 2019-10-23

## 2021-11-17 PROBLEM — L50.9 URTICARIA: Status: RESOLVED | Noted: 2018-03-06 | Resolved: 2021-11-17

## 2021-11-17 PROBLEM — R29.890 LOSS OF HEIGHT: Status: ACTIVE | Noted: 2018-09-21

## 2021-11-17 PROBLEM — Z00.00 WELL ADULT HEALTH CHECK: Status: ACTIVE | Noted: 2021-01-15

## 2021-11-17 PROBLEM — R20.0 NUMBNESS OF EXTREMITY: Status: ACTIVE | Noted: 2021-07-21

## 2021-11-17 PROBLEM — I25.10 CAD (CORONARY ARTERY DISEASE): Status: ACTIVE | Noted: 2021-01-15

## 2021-11-17 PROBLEM — Z86.11 HISTORY OF TUBERCULOSIS: Status: ACTIVE | Noted: 2021-11-17

## 2021-11-17 PROBLEM — Z00.00 WELL ADULT HEALTH CHECK: Status: RESOLVED | Noted: 2021-01-15 | Resolved: 2021-11-17

## 2021-11-17 PROBLEM — Z86.11 HISTORY OF TUBERCULOSIS: Status: RESOLVED | Noted: 2021-11-17 | Resolved: 2021-11-17

## 2021-11-17 PROBLEM — Z95.0 CARDIAC PACEMAKER: Status: ACTIVE | Noted: 2021-01-15

## 2021-11-17 PROCEDURE — 99214 OFFICE O/P EST MOD 30 MIN: CPT | Mod: 25,GC | Performed by: STUDENT IN AN ORGANIZED HEALTH CARE EDUCATION/TRAINING PROGRAM

## 2021-11-17 PROCEDURE — G0008 ADMIN INFLUENZA VIRUS VAC: HCPCS | Mod: GC | Performed by: INTERNAL MEDICINE

## 2021-11-17 PROCEDURE — 90662 IIV NO PRSV INCREASED AG IM: CPT | Mod: GC | Performed by: INTERNAL MEDICINE

## 2021-11-17 RX ORDER — ASCORBIC ACID 500 MG
500 TABLET ORAL DAILY
COMMUNITY

## 2021-11-17 RX ORDER — ROSUVASTATIN CALCIUM 20 MG/1
20 TABLET, COATED ORAL EVERY EVENING
COMMUNITY
End: 2022-01-25 | Stop reason: SDUPTHER

## 2021-11-17 RX ORDER — CARVEDILOL 25 MG/1
1 TABLET ORAL 2 TIMES DAILY
COMMUNITY
Start: 2021-10-20 | End: 2022-01-25 | Stop reason: SDUPTHER

## 2021-11-17 RX ORDER — LISINOPRIL 40 MG/1
40 TABLET ORAL
Qty: 90 TABLET | Refills: 1 | COMMUNITY
Start: 2021-11-17 | End: 2022-01-25 | Stop reason: SDUPTHER

## 2021-11-17 RX ORDER — FAMOTIDINE 20 MG/1
20 TABLET, FILM COATED ORAL 2 TIMES DAILY
Qty: 60 TABLET | Refills: 0 | Status: SHIPPED | OUTPATIENT
Start: 2021-11-17 | End: 2021-12-13 | Stop reason: SDUPTHER

## 2021-11-17 ASSESSMENT — PATIENT HEALTH QUESTIONNAIRE - PHQ9: CLINICAL INTERPRETATION OF PHQ2 SCORE: 0

## 2021-11-17 ASSESSMENT — FIBROSIS 4 INDEX: FIB4 SCORE: 2.55

## 2021-11-17 NOTE — PROGRESS NOTES
"Leah Cortez is a 81 y.o. female here for a non-provider visit for:   FLU    Reason for immunization: Annual Flu Vaccine  Immunization records indicate need for vaccine: Yes, confirmed with Epic  Minimum interval has been met for this vaccine: Yes  ABN completed: Yes    VIS Dated  08/06/2021 was given to patient: Yes  All IAC Questionnaire questions were answered \"No.\"    Patient tolerated injection and no adverse effects were observed or reported: Yes    Pt scheduled for next dose in series: Not Indicated  "

## 2021-11-17 NOTE — PROGRESS NOTES
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 Looks good mostly in 120s-130s, occasionally in 150s. BP in clinic today is 140/90    #screening for osteoporosis   DEXA from 2008 to 2015 showed decrease in bone density, started on bisphosphonate and reclast infusion. .Bisphosphonate has been discontinued about 5 years ago. Continuing reclast infusion. Last reclast infusion was last June 2021.  Takes vitamin d and calcium.    dexa 2021: major osteoportic 10.5%, Hip fracture 3.3%  dexa 2017: Major Osteoporotic     7.6% , Hip     1.8%       #CAD  #aortic sclerosis  #pacemaker    Sees cardiologist at [facility] every 6 months. History of aortic sclerosis and bradycardia s/p pacemaker placement 2013.    #heartburn  Stopping tums since already taking calcium. Famotidine works for her.      No smoking, alcohol, drugs. Lives at home with daugther.    Health maintenance:  dexa scan due 2023  Last mammogram 2019 normal    Patient Active Problem List    Diagnosis Date Noted   • Numbness of extremity 07/21/2021   • CAD (coronary artery disease) 01/15/2021   • Cardiac pacemaker 01/15/2021   • Dry eyes 09/10/2020   • Generalized anxiety disorder 09/10/2020   • Personal history of tuberculosis 10/23/2019   • Scoliosis 11/19/2018   • Loss of height 09/21/2018   • Seasonal allergic rhinitis due to pollen 05/10/2018   • Lentigo 03/06/2018   • Age-related osteoporosis without current pathological fracture 11/15/2017   • Glucose intolerance (impaired glucose tolerance) 04/26/2017   • Impaired fasting glucose 04/26/2016   • Seasonal allergies 11/07/2014   • Gastroesophageal reflux disease 11/07/2014   • Aortic valve sclerosis 07/10/2014   •  Degeneration of intervertebral disc of cervical region 07/10/2014   • Dyslipidemia 07/10/2014   • Vitamin D deficiency 07/10/2014   • Vitiligo 07/10/2014   • Osteoporosis 07/10/2014   • Insomnia 07/10/2014   • Vitamin d deficiency 10/18/2012   • Family history of coronary artery disease 04/05/2012   • Hyperlipidemia 09/21/2011   • Essential hypertension 09/21/2011   • Abnormal electrocardiogram 05/24/2010   • History of breast cancer 04/08/2009       Current Outpatient Medications   Medication Sig Dispense Refill   • carvedilol (COREG) 25 MG Tab Take 1 Tablet by mouth every day.     • rosuvastatin (CRESTOR) 20 MG Tab Take 20 mg by mouth every evening.     • ascorbic acid (VITAMIN C) 500 MG tablet Take 500 mg by mouth every day.     • lisinopril (PRINIVIL) 40 MG tablet Take 1 Tablet by mouth every day. 90 Tablet 1   • famotidine (PEPCID) 20 MG Tab Take 1 Tablet by mouth 2 times a day. 60 Tablet 0   • fluticasone (FLONASE) 50 MCG/ACT nasal spray SPRAY 2 SPRAYS IN EACH NOSTRIL EVERY DAY. 3 Bottle 2   • FISH OIL by Does not apply route.       • aspirin 81 MG tablet Take  by mouth every day.     • Cholecalciferol (VITAMIN D) 2000 UNIT TABS Take  by mouth. Take 1 tablet daily.        No current facility-administered medications for this visit.       ROS: As per HPI. Additional pertinent systems as noted below.  Constitutional: Negative for chills and fever.   HENT: Negative for ear pain and sore throat.    Eyes: Negative for discharge and redness.   Respiratory: Negative for cough, hemoptysis, wheezing and stridor.    Cardiovascular: Negative for chest pain, palpitations and leg swelling.   Gastrointestinal: Negative for abdominal pain, constipation, diarrhea,  nausea and vomiting.   Genitourinary: Negative for dysuria, flank pain and hematuria.   Musculoskeletal: Negative for falls and myalgias.   Skin: Negative for itching and rash.   Neurological: Negative for dizziness, seizures, loss of consciousness and headaches.  "  Endo/Heme/Allergies: Negative for polydipsia. Does not bruise/bleed easily.   Psychiatric/Behavioral: Negative for substance abuse and suicidal ideas.       /90 (BP Location: Left arm, Patient Position: Sitting, BP Cuff Size: Small adult)   Pulse 64   Temp 36.6 °C (97.8 °F) (Temporal)   Ht 1.473 m (4' 10\")   Wt 49.8 kg (109 lb 12.8 oz)   SpO2 97%   BMI 22.95 kg/m²     Physical Exam   Constitutional:  oriented to person, place, and time. No distress.   Eyes: Pupils are equal, round, and reactive to light. No scleral icterus.  Neck: Neck supple. No thyromegaly present.   Cardiovascular: Normal rate, regular rhythm and normal heart sounds.  Exam reveals no gallop and no friction rub.  No murmur heard.  Pulmonary/Chest: Breath sounds normal.   Musculoskeletal:   no edema. Osteoarthritic changes noted on fingers.  Lymphadenopathy: no cervical adenopathy  Neurological: alert and oriented to person, place, and time.   Skin: No cyanosis. Nails show no clubbing.      Assessment and Plan    #HTN  Blood pressure measured at home. Looks good mostly in 120s-130s, occasionally in 150s. BP in clinic today is 140/90    plan:  instructed patient to measure blood pressure and bring the log to next visit.    #screening for osteoporosis   DEXA from 2008 to 2015 showed decrease in bone density, started on bisphosphonate and reclast infusion. .Bisphosphonate has been discontinued about 5 years ago. Continuing reclast infusion. Last reclast infusion was last June 2021.  Takes vitamin d and calcium.    dexa 2021: major osteoportic 10.5%, Hip fracture 3.3%  dexa 2017: Major Osteoporotic     7.6% , Hip     1.8%     Plan:  Continue reclast influsion  Continue vitamin d    #personal history of breast cancer  s/p lumpectomy and radiation 2009, mammograms normal since then. last mammogram 2019.    plan:  -discuss on next visit about whether to continue mammogram screening or not. per guidelines, there is no clear benefit to " continuing annual mammogramy screening in women over age 75.       #GERD  Stopping tums since already taking calcium    Plan:  Take famotidine        Followup: Return in about 5 weeks (around 12/22/2021).      Signed by: Chino Diana M.D.

## 2021-11-17 NOTE — PATIENT INSTRUCTIONS
-follow up in 5 weeks. Do labs (fasting) 1 week before next visit.  -Measure blood pressure once daily at the same time of the day. No smoking, alcohol, drugs before measuring blood pressure.  Rest for 10 minutes before measuring blood pressure with feet down on the ground and arm at heart level in sitting position. Bring the blood pressure log on next visit.    -Take famotidine instead of behzad

## 2021-12-13 RX ORDER — FAMOTIDINE 20 MG/1
20 TABLET, FILM COATED ORAL 2 TIMES DAILY
Qty: 60 TABLET | Refills: 3 | Status: SHIPPED | OUTPATIENT
Start: 2021-12-13 | End: 2022-02-28

## 2021-12-13 NOTE — TELEPHONE ENCOUNTER
Last seen: 11/17/2021 by Dr. Diana  Next appt: 01/25/2022 with Dr. Diana    Was the patient seen in the last year in this department? Yes   Does patient have an active prescription for medications requested? No   Received Request Via: Pharmacy

## 2022-01-25 ENCOUNTER — OFFICE VISIT (OUTPATIENT)
Dept: INTERNAL MEDICINE | Facility: OTHER | Age: 82
End: 2022-01-25
Payer: MEDICARE

## 2022-01-25 VITALS
BODY MASS INDEX: 21.66 KG/M2 | HEIGHT: 58 IN | DIASTOLIC BLOOD PRESSURE: 76 MMHG | WEIGHT: 103.2 LBS | OXYGEN SATURATION: 96 % | HEART RATE: 85 BPM | TEMPERATURE: 98.3 F | SYSTOLIC BLOOD PRESSURE: 151 MMHG

## 2022-01-25 DIAGNOSIS — J30.1 SEASONAL ALLERGIC RHINITIS DUE TO POLLEN: ICD-10-CM

## 2022-01-25 PROCEDURE — 99213 OFFICE O/P EST LOW 20 MIN: CPT | Mod: GE | Performed by: STUDENT IN AN ORGANIZED HEALTH CARE EDUCATION/TRAINING PROGRAM

## 2022-01-25 RX ORDER — ROSUVASTATIN CALCIUM 20 MG/1
20 TABLET, COATED ORAL EVERY EVENING
Qty: 90 TABLET | Refills: 2 | Status: SHIPPED | OUTPATIENT
Start: 2022-01-25 | End: 2022-09-06

## 2022-01-25 RX ORDER — LISINOPRIL 40 MG/1
40 TABLET ORAL
Qty: 90 TABLET | Refills: 2 | Status: SHIPPED | OUTPATIENT
Start: 2022-01-25 | End: 2022-10-13

## 2022-01-25 RX ORDER — FLUTICASONE PROPIONATE 50 MCG
SPRAY, SUSPENSION (ML) NASAL
Qty: 18.2 ML | Refills: 1 | Status: SHIPPED | OUTPATIENT
Start: 2022-01-25 | End: 2022-04-28

## 2022-01-25 RX ORDER — CARVEDILOL 25 MG/1
25 TABLET ORAL 2 TIMES DAILY
Qty: 180 TABLET | Refills: 2 | Status: SHIPPED | OUTPATIENT
Start: 2022-01-25 | End: 2022-10-06

## 2022-01-25 ASSESSMENT — FIBROSIS 4 INDEX: FIB4 SCORE: 2.91

## 2022-01-25 ASSESSMENT — PATIENT HEALTH QUESTIONNAIRE - PHQ9: CLINICAL INTERPRETATION OF PHQ2 SCORE: 0

## 2022-01-25 NOTE — PROGRESS NOTES
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    Patient states that she will follow up with cardiology in April. No chest pain, shortness of breath, palpitation.    She states that her heartburn got worse since stopping famotidine. She would like to resume taking famotidine since tums don't help.    Lab review:    a1c 6.5 (6.2 to 6.5 in 1 year), glucose 95      No smoking, alcohol, drugs. Lives at home with daugther.    Health maintenance:  -dexa scan due 2023  -Last mammogram 2019 normal;screening discontinued after discussion with patient given her age and limited benefit.      Patient Active Problem List    Diagnosis Date Noted   • Numbness of extremity 07/21/2021   • CAD (coronary artery disease) 01/15/2021   • Cardiac pacemaker 01/15/2021   • Dry eyes 09/10/2020   • Generalized anxiety disorder 09/10/2020   • Personal history of tuberculosis 10/23/2019   • Scoliosis 11/19/2018   • Loss of height 09/21/2018   • Seasonal allergic rhinitis due to pollen 05/10/2018   • Lentigo 03/06/2018   • Age-related osteoporosis without current pathological fracture 11/15/2017   • Glucose intolerance (impaired glucose tolerance) 04/26/2017   • Impaired fasting glucose 04/26/2016   • Seasonal allergies 11/07/2014   • Gastroesophageal reflux disease 11/07/2014   • Aortic valve sclerosis 07/10/2014   • Degeneration of intervertebral disc of cervical region 07/10/2014   • Dyslipidemia 07/10/2014   • Vitamin D deficiency 07/10/2014   • Vitiligo 07/10/2014   • Osteoporosis 07/10/2014   • Insomnia 07/10/2014   • Vitamin d deficiency 10/18/2012   • Family history of coronary artery disease  "04/05/2012   • Hyperlipidemia 09/21/2011   • Essential hypertension 09/21/2011   • Abnormal electrocardiogram 05/24/2010   • History of breast cancer 04/08/2009       Current Outpatient Medications   Medication Sig Dispense Refill   • rosuvastatin (CRESTOR) 20 MG Tab Take 1 Tablet by mouth every evening. 90 Tablet 2   • fluticasone (FLONASE) 50 MCG/ACT nasal spray SPRAY 2 SPRAYS IN EACH NOSTRIL EVERY DAY. 18.2 mL 1   • lisinopril (PRINIVIL) 40 MG tablet Take 1 Tablet by mouth every day. 90 Tablet 2   • carvedilol (COREG) 25 MG Tab Take 1 Tablet by mouth 2 times a day. 180 Tablet 2   • famotidine (PEPCID) 20 MG Tab Take 1 Tablet by mouth 2 times a day. 60 Tablet 3   • ascorbic acid (VITAMIN C) 500 MG tablet Take 500 mg by mouth every day.     • FISH OIL by Does not apply route.       • aspirin 81 MG tablet Take  by mouth every day.     • Cholecalciferol (VITAMIN D) 2000 UNIT TABS Take  by mouth. Take 1 tablet daily.        No current facility-administered medications for this visit.       ROS: As per HPI. Additional pertinent systems as noted below.  Constitutional: Negative for chills and fever.   HENT: Negative for ear pain and sore throat.    Eyes: Negative for discharge and redness.   Respiratory: Negative for cough, hemoptysis, wheezing and stridor.    Cardiovascular: Negative for chest pain, palpitations and leg swelling.   Gastrointestinal: Negative for abdominal pain, constipation, diarrhea,  nausea and vomiting.   Genitourinary: Negative for dysuria, flank pain and hematuria.   Musculoskeletal: Negative for falls and myalgias.   Skin: Negative for itching and rash.   Neurological: Negative for dizziness, seizures, loss of consciousness and headaches.   Endo/Heme/Allergies: Negative for polydipsia. Does not bruise/bleed easily.   Psychiatric/Behavioral: Negative for substance abuse and suicidal ideas.       /76   Pulse 85   Temp 36.8 °C (98.3 °F) (Temporal)   Ht 1.473 m (4' 9.99\")   Wt 46.8 kg (103 " lb 3.2 oz)   SpO2 96%   BMI 21.57 kg/m²     Physical Exam   Constitutional:  oriented to person, place, and time. No distress.   Eyes: Pupils are equal, round, and reactive to light. No scleral icterus.  Neck: Neck supple. No thyromegaly present.   Cardiovascular: Normal rate, regular rhythm and normal heart sounds.  Exam reveals no gallop and no friction rub.  No murmur heard.  Pulmonary/Chest: Breath sounds normal.   Musculoskeletal:   no edema. Osteoarthritic changes noted on fingers.  Lymphadenopathy: no cervical adenopathy  Neurological: alert and oriented to person, place, and time.   Skin: No cyanosis. Nails show no clubbing.      Assessment and Plan    #HTN  Blood pressure measured at home. Looks good mostly in 110s-120s at home, occasionally in 150s and 90s. BP in clinic today is 151/76. Due to fluctuating blood pressures at home and risks for falls, will hold off on increasing her antihypertensives for now.    Plan:  -continue lisinopril 40 mg daily  -continue carvedilol 25 mg BID. May increase to 37.5 mg BID once blood pressure stabilizes and remains high.      #dyslipidemia  , patient states that she thinks her LDL increased since starting eating eggs recently. She says that she would like to hold off on increasing the dose to see if stopping eggs may improve the LDL level.    Plan:  -Continue current dose of crestor 20 mg daily, holding off on increasing the dose due to fall risk, myopathy as well as limited benefit given her age and current LDL level.      #GERD  Worse since stopping famotidine. tums dont help. Would like famotidine instead.    Plan:  Famotidine 20 mg BID    #high anion gap metabolic acidosis  Bicarb 21. Gap 14. Unclear etiolgy but mild. No abdominal pain, shortness of breath, hypoxia. Holding off on getting lactic acid. May be due to dehydration causing volume depletion causing lactic acidosis. Patient has not been eating as well due to heartburn since stopping famotidine.  Kidney function and liver function unremarkable. Encouraged eating and hydration.     #screening for osteoporosis   DEXA from 2008 to 2015 showed decrease in bone density, started on bisphosphonate and reclast infusion. .Bisphosphonate has been discontinued about 5 years ago. Continuing reclast infusion. Last reclast infusion was last June 2021.  Takes vitamin d and calcium.    dexa 2021: major osteoportic 10.5%, Hip fracture 3.3%  dexa 2017: Major Osteoporotic     7.6% , Hip     1.8%     Plan:  Continue reclast influsion, due in June 2022  Continue vitamin d    #personal history of breast cancer  s/p lumpectomy and radiation 2009, mammograms normal since then. last mammogram 2019. there is no clear benefit to continuing annual mammogramy screening in women over age 75.       #aortic sclerosis  #sinus bradycardia  Sees cardiologist at [facility] every 6 months. History of aortic sclerosis and bradycardia s/p pacemaker placement 2013. Patient states that she will follow up with cardiology in April.    Followup: Return in about 6 months (around 7/25/2022).      Signed by: Chino Diana M.D.

## 2022-02-28 RX ORDER — FAMOTIDINE 20 MG/1
TABLET, FILM COATED ORAL
Qty: 180 TABLET | Refills: 2 | Status: SHIPPED | OUTPATIENT
Start: 2022-02-28 | End: 2022-10-06

## 2022-02-28 NOTE — TELEPHONE ENCOUNTER
Last seen: 01/25/2022 by Dr. Diana  Next appt: None  Was the patient seen in the last year in this department? Yes   Does patient have an active prescription for medications requested? No   Received Request Via: Pharmacy

## 2022-04-28 DIAGNOSIS — J30.1 SEASONAL ALLERGIC RHINITIS DUE TO POLLEN: ICD-10-CM

## 2022-04-28 RX ORDER — FLUTICASONE PROPIONATE 50 MCG
SPRAY, SUSPENSION (ML) NASAL
Qty: 16 G | Refills: 1 | Status: SHIPPED | OUTPATIENT
Start: 2022-04-28 | End: 2022-07-10

## 2022-04-28 NOTE — TELEPHONE ENCOUNTER
Fluticasone Refill    Last seen: 1/25/22 by Dr. Diana  Next appt: 6/16/22 with Dr. Diana    Was the patient seen in the last year in this department? Yes   Does patient have an active prescription for medications requested? No   Received Request Via: Pharmacy

## 2022-06-16 ENCOUNTER — OFFICE VISIT (OUTPATIENT)
Dept: INTERNAL MEDICINE | Facility: OTHER | Age: 82
End: 2022-06-16
Payer: MEDICARE

## 2022-06-16 VITALS
SYSTOLIC BLOOD PRESSURE: 103 MMHG | OXYGEN SATURATION: 96 % | TEMPERATURE: 97.8 F | HEART RATE: 76 BPM | DIASTOLIC BLOOD PRESSURE: 65 MMHG | HEIGHT: 58 IN | BODY MASS INDEX: 21.83 KG/M2 | WEIGHT: 104 LBS

## 2022-06-16 DIAGNOSIS — M81.0 AGE-RELATED OSTEOPOROSIS WITHOUT CURRENT PATHOLOGICAL FRACTURE: ICD-10-CM

## 2022-06-16 DIAGNOSIS — Z00.00 MEDICARE ANNUAL WELLNESS VISIT, SUBSEQUENT: Primary | ICD-10-CM

## 2022-06-16 DIAGNOSIS — Z00.00 ENCOUNTER FOR ANNUAL WELLNESS VISIT (AWV) IN MEDICARE PATIENT: ICD-10-CM

## 2022-06-16 PROCEDURE — G0439 PPPS, SUBSEQ VISIT: HCPCS | Mod: GE | Performed by: STUDENT IN AN ORGANIZED HEALTH CARE EDUCATION/TRAINING PROGRAM

## 2022-06-16 ASSESSMENT — ENCOUNTER SYMPTOMS: GENERAL WELL-BEING: GOOD

## 2022-06-16 ASSESSMENT — PATIENT HEALTH QUESTIONNAIRE - PHQ9: CLINICAL INTERPRETATION OF PHQ2 SCORE: 0

## 2022-06-16 ASSESSMENT — ACTIVITIES OF DAILY LIVING (ADL): BATHING_REQUIRES_ASSISTANCE: 0

## 2022-06-16 ASSESSMENT — FIBROSIS 4 INDEX: FIB4 SCORE: 2.91

## 2022-06-16 NOTE — PROGRESS NOTES
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    Colonoscopy:  3 years ago, and due 2024  Mammogram: 2019 normal, declined further screening.  Pap smear: had hysterectomy  LDCT:n/a never smoked  AAA screening: n/a never smoked  Smoker: no  Diabetes screening: no  Hepatitis C screening: no. declined  HIV screening: no, declined  Gonorrhea/Chlamydia screening: no, declined  Syphilis Screening:(high risk): no, declined  ASCVD risk: not applicable  Immunizations: up to date  Influenza: yes  COVID-19: yes  Shingrix: yes  Tdap: yes, more than 10 years ago  Prevnar 13: yes  PPSV 23:yes  HPV: no  Hepatitis A: no  Hepatitis B: yes        Patient Active Problem List    Diagnosis Date Noted   • Encounter for annual wellness visit (AWV) in Medicare patient 06/16/2022   • Numbness of extremity 07/21/2021   • CAD (coronary artery disease) 01/15/2021   • Cardiac pacemaker 01/15/2021   • Dry eyes 09/10/2020   • Generalized anxiety disorder 09/10/2020   • Personal history of tuberculosis 10/23/2019   • Scoliosis 11/19/2018   • Loss of height 09/21/2018   • Seasonal allergic rhinitis due to pollen 05/10/2018   • Lentigo 03/06/2018   • Age-related osteoporosis without current pathological fracture 11/15/2017   • Glucose intolerance (impaired glucose tolerance) 04/26/2017   • Impaired fasting glucose 04/26/2016   • Encounter for annual wellness exam in Medicare patient 11/05/2015   • Seasonal allergies 11/07/2014   • Gastroesophageal reflux disease 11/07/2014   • Aortic valve sclerosis 07/10/2014   • Degeneration of intervertebral disc of cervical region 07/10/2014   • Dyslipidemia 07/10/2014   • Vitamin D deficiency 07/10/2014   • Vitiligo 07/10/2014   •  Osteoporosis 07/10/2014   • Insomnia 07/10/2014   • Vitamin d deficiency 10/18/2012   • Family history of coronary artery disease 04/05/2012   • Hyperlipidemia 09/21/2011   • Essential hypertension 09/21/2011   • Abnormal electrocardiogram 05/24/2010   • History of breast cancer 04/08/2009       Current Outpatient Medications   Medication Sig Dispense Refill   • fluticasone (FLONASE) 50 MCG/ACT nasal spray 1 SPRAY IN EACH NOSTRIL EVERY DAY. 16 g 1   • famotidine (PEPCID) 20 MG Tab TAKE 1 TABLET TWICE DAILY 180 Tablet 2   • rosuvastatin (CRESTOR) 20 MG Tab Take 1 Tablet by mouth every evening. 90 Tablet 2   • lisinopril (PRINIVIL) 40 MG tablet Take 1 Tablet by mouth every day. 90 Tablet 2   • carvedilol (COREG) 25 MG Tab Take 1 Tablet by mouth 2 times a day. 180 Tablet 2   • ascorbic acid (VITAMIN C) 500 MG tablet Take 500 mg by mouth every day.     • FISH OIL by Does not apply route.       • aspirin 81 MG tablet Take  by mouth every day.     • Cholecalciferol (VITAMIN D) 2000 UNIT TABS Take  by mouth. Take 1 tablet daily.        No current facility-administered medications for this visit.        Patient is taking medications as noted in medication list.  Current supplements as per medication list.     Allergies: Patient has no known allergies.    Current social contact/activities: praying, spends time with family    Is patient current with immunizations? Yes.    She  reports that she has never smoked. She has never used smokeless tobacco. She reports that she does not drink alcohol and does not use drugs.  Counseling given: Not Answered      DPA/Advanced directive: Patient has Advanced Directive, but it is not on file. Instructed to bring in a copy to scan into their chart.    ROS:    Gait: Uses no assistive device   Ostomy: No   Other tubes: No   Amputations: No   Chronic oxygen use No   Last eye exam  Every year in December, 2021  Wears hearing aids: No   : Denies any urinary leakage during the last 6  months    Screening:    Depression Screening  Little interest or pleasure in doing things?  0 - not at all  Feeling down, depressed, or hopeless? 0 - not at all  Trouble falling or staying asleep, or sleeping too much?     Feeling tired or having little energy?     Poor appetite or overeating?     Feeling bad about yourself - or that you are a failure or have let yourself or your family down?    Trouble concentrating on things, such as reading the newspaper or watching television?    Moving or speaking so slowly that other people could have noticed.  Or the opposite - being so fidgety or restless that you have been moving around a lot more than usual?     Thoughts that you would be better off dead, or of hurting yourself?     Patient Health Questionnaire Score:      If depressive symptoms identified deferred to follow up visit unless specifically addressed in assessment and plan.    Interpretation of PHQ-9 Total Score   Score Severity   1-4 No Depression   5-9 Mild Depression   10-14 Moderate Depression   15-19 Moderately Severe Depression   20-27 Severe Depression      Screening for Cognitive Impairment  Three Minute Recall (daughter, heaven, mountain)  3/3    Draw clock face with all 12 numbers and set the hands to show 10 past 11.  Yes    If cognitive concerns identified, deferred for follow up unless specifically addressed in assessment and plan.    Fall Risk Assessment  Has the patient had two or more falls in the last year or any fall with injury in the last year?  No  If fall risk identified, deferred for follow up unless specifically addressed in assessment and plan.    Safety Assessment  Throw rugs on floor.  Yes  Handrails on all stairs.  Yes  Good lighting in all hallways.  Yes  Difficulty hearing.  Yes  Patient counseled about all safety risks that were identified.    Functional Assessment ADLs  Are there any barriers preventing you from cooking for yourself or meeting nutritional needs?  No.    Are  there any barriers preventing you from driving safely or obtaining transportation?  No.    Are there any barriers preventing you from using a telephone or calling for help?  No.    Are there any barriers preventing you from shopping?  No.    Are there any barriers preventing you from taking care of your own finances?  No.    Are there any barriers preventing you from managing your medications?    No.    Are there any barriers preventing you from showering, bathing or dressing yourself?  No.    Are you currently engaging in any exercise or physical activity?  No.  Walk once a week   What is your perception of your health?  Good.    Health Maintenance Summary          Overdue - IMM DTaP/Tdap/Td Vaccine (1 - Tdap) Overdue - never done    No completion history exists for this topic.          Overdue - MAMMOGRAM (Yearly) Overdue since 4/10/2020    04/10/2019  MA-SCREENING MAMMO BILAT W/TOMOSYNTHESIS W/CAD    04/04/2018  MA-MAMMO SCREENING BILAT W/VALERIE W/O CAD    04/03/2017  MA-MAMMO DIAGNOSTIC BILAT W/VALERIE W/CAD          COVID-19 Vaccine (4 - Booster for Moderna series) Next due on 8/27/2022 04/27/2022  Imm Admin: PFIZER SUÁREZ CAP SARS-COV-2 VACCINATION (12+)    03/20/2021  Imm Admin: Moderna SARS-CoV-2 Vaccine    02/20/2021  Imm Admin: Moderna SARS-CoV-2 Vaccine          BONE DENSITY (Every 5 Years) Next due on 12/14/2022 12/14/2017  DS-BONE DENSITY STUDY (DEXA)    01/19/2015  DS-BONE DENSITY STUDY (DEXA)    04/04/2008  DS-BONE DENSITY STUDY (DEXA)          Annual Wellness Visit (Every 366 Days) Next due on 6/17/2023 06/16/2022  Visit Dx: Medicare annual wellness visit, subsequent    06/16/2022  Level of Service: ANNUAL WELLNESS VISIT-INCLUDES PPPS SUBSEQUE*    03/06/2019  Subsequent Annual Wellness Visit - Includes PPPS ()    03/06/2019  Visit Dx: Encounter for Medicare annual wellness exam    02/03/2017  Visit Dx: Medicare annual wellness visit, initial          COLORECTAL CANCER SCREENING (COLONOSCOPY -  Every 5 Years) Next due on 3/14/2024    03/14/2019  REFERRAL TO GI FOR COLONOSCOPY    01/07/2019  COLOGUARD COLON CANCER SCREENING          IMM PNEUMOCOCCAL VACCINE: 65+ Years (Series Information) Completed    11/15/2017  Imm Admin: Pneumococcal polysaccharide vaccine (PPSV-23)    10/26/2016  Imm Admin: Pneumococcal Conjugate Vaccine (Prevnar/PCV-13)          IMM ZOSTER VACCINES (Series Information) Completed    04/07/2021  Imm Admin: Zoster Vaccine Recombinant (RZV) (SHINGRIX)    10/04/2020  Imm Admin: Zoster Vaccine Recombinant (RZV) (SHINGRIX)          IMM INFLUENZA (Series Information) Completed    11/17/2021  Imm Admin: Influenza Vaccine Adult HD    10/12/2020  Imm Admin: Influenza Vaccine Adult HD    10/14/2019  Imm Admin: Influenza Vaccine Adult HD    12/06/2018  Imm Admin: Influenza Vaccine Adult HD    11/15/2017  Imm Admin: Influenza Vaccine Adult HD    Only the first 5 history entries have been loaded, but more history exists.          IMM HEP B VACCINE (Series Information) Aged Out    No completion history exists for this topic.          IMM MENINGOCOCCAL VACCINE (MCV4) (Series Information) Aged Out    No completion history exists for this topic.          Discontinued - PAP SMEAR  Discontinued    No completion history exists for this topic.                Patient Care Team:  Luz Marina Figueroa M.D. as PCP - General  Kamilla Bains M.D. as Consulting Physician (Cardiovascular Disease (Cardiology))    Social History     Tobacco Use   • Smoking status: Never Smoker   • Smokeless tobacco: Never Used   Substance Use Topics   • Alcohol use: No     Alcohol/week: 0.0 oz   • Drug use: No     Family History   Problem Relation Age of Onset   • Stroke Father    • Arthritis Mother    • Breast Cancer Sister         X 2     She  has a past medical history of Abnormal electrocardiogram (5/24/2010), DDD (degenerative disc disease), cervical, Dyslipidemia, Edema (9/21/2011), Family history of coronary artery disease (4/5/2012),  "Foot pain (4/8/2009), History of breast cancer (4/8/2009), HTN (hypertension) (9/21/2011), Hyperlipidemia (9/21/2011), Insomnia, Murmur (9/21/2011), Nonspecific abnormal unspecified cardiovascular function study (4/30/2009), Osteoporosis, Vitamin d deficiency (10/18/2012), and Vitiligo.   Past Surgical History:   Procedure Laterality Date   • CYST EXCISION      LUNG   • HYSTERECTOMY, TOTAL ABDOMINAL     • OTHER      BREAST SURGERY LUMPECTOMY.   • OTHER      PACEMAKER         Exam:   /65 (BP Location: Left arm, Patient Position: Sitting, BP Cuff Size: Small adult)   Pulse 76   Temp 36.6 °C (97.8 °F) (Temporal)   Ht 1.473 m (4' 10\")   Wt 47.2 kg (104 lb)   SpO2 96%  Body mass index is 21.74 kg/m².    Hearing good.    Dentition good  Alert, oriented in no acute distress  Eye contact is good, speech goal directed, affect calm    Assessment and Plan. The following treatment and monitoring plan is recommended:    Get tetanus shot      1. Encounter for annual wellness visit (AWV) in Medicare patient    2. Age-related osteoporosis without current pathological fracture  - Comp Metabolic Panel; Future    3. Medicare annual wellness visit, subsequent       Services suggested: No services needed at this time  Health Care Screening recommendations as per orders if indicated.  Referrals offered: PT/OT/Nutrition counseling/Behavioral Health/Smoking cessation as per orders if indicated.    Discussion today about general wellness and lifestyle habits:    · Prevent falls and reduce trip hazards; Cautioned about securing or removing rugs.  · Have a working fire alarm and carbon monoxide detector;   · Engage in regular physical activity and social activities.     Follow-up: Return in about 3 months (around 9/16/2022).  "

## 2022-06-17 ENCOUNTER — TELEPHONE (OUTPATIENT)
Dept: INTERNAL MEDICINE | Facility: OTHER | Age: 82
End: 2022-06-17
Payer: MEDICARE

## 2022-06-22 RX ORDER — ZOLEDRONIC ACID 5 MG/100ML
5 INJECTION, SOLUTION INTRAVENOUS ONCE
Status: CANCELLED | OUTPATIENT
Start: 2022-07-15 | End: 2022-06-29

## 2022-07-07 ENCOUNTER — TELEPHONE (OUTPATIENT)
Dept: ANESTHESIOLOGY | Facility: MEDICAL CENTER | Age: 82
End: 2022-07-07
Payer: MEDICARE

## 2022-07-07 DIAGNOSIS — M81.0 AGE-RELATED OSTEOPOROSIS WITHOUT CURRENT PATHOLOGICAL FRACTURE: ICD-10-CM

## 2022-07-10 DIAGNOSIS — M81.0 AGE-RELATED OSTEOPOROSIS WITHOUT CURRENT PATHOLOGICAL FRACTURE: ICD-10-CM

## 2022-07-10 RX ORDER — ZOLEDRONIC ACID 5 MG/100ML
5 INJECTION, SOLUTION INTRAVENOUS ONCE
Status: CANCELLED | OUTPATIENT
Start: 2023-07-11 | End: 2023-07-11

## 2022-07-10 RX ORDER — ZOLEDRONIC ACID 5 MG/100ML
5 INJECTION, SOLUTION INTRAVENOUS ONCE
Status: CANCELLED | OUTPATIENT
Start: 2022-07-17 | End: 2022-07-17

## 2022-09-06 RX ORDER — ROSUVASTATIN CALCIUM 20 MG/1
TABLET, COATED ORAL
Qty: 90 TABLET | Refills: 2 | Status: SHIPPED | OUTPATIENT
Start: 2022-09-06 | End: 2023-10-16

## 2022-09-06 NOTE — TELEPHONE ENCOUNTER
Rosuvastatin Refill    Last seen: 6/16/22 by Dr. Diana  Next appt: 10/12/22 with Dr. Diana    Was the patient seen in the last year in this department? Yes   Does patient have an active prescription for medications requested? No   Received Request Via: Pharmacy

## 2022-09-27 ENCOUNTER — TELEPHONE (OUTPATIENT)
Dept: INTERNAL MEDICINE | Facility: OTHER | Age: 82
End: 2022-09-27
Payer: MEDICARE

## 2022-09-27 DIAGNOSIS — E11.9 TYPE 2 DIABETES MELLITUS WITHOUT COMPLICATION, WITHOUT LONG-TERM CURRENT USE OF INSULIN (HCC): ICD-10-CM

## 2022-10-05 NOTE — TELEPHONE ENCOUNTER
Famotidine and Carvedilol Refills    Last seen: 6/16/22 by Dr. Diana  Next appt: 10/12/22 with Dr. Diana    Was the patient seen in the last year in this department? Yes   Does patient have an active prescription for medications requested? No   Received Request Via: Pharmacy

## 2022-10-06 RX ORDER — FAMOTIDINE 20 MG/1
TABLET, FILM COATED ORAL
Qty: 180 TABLET | Refills: 2 | Status: SHIPPED | OUTPATIENT
Start: 2022-10-06 | End: 2023-07-26 | Stop reason: SDUPTHER

## 2022-10-06 RX ORDER — CARVEDILOL 25 MG/1
TABLET ORAL
Qty: 180 TABLET | Refills: 2 | Status: SHIPPED | OUTPATIENT
Start: 2022-10-06 | End: 2022-10-12 | Stop reason: SDUPTHER

## 2022-10-12 ENCOUNTER — OFFICE VISIT (OUTPATIENT)
Dept: INTERNAL MEDICINE | Facility: OTHER | Age: 82
End: 2022-10-12
Payer: MEDICARE

## 2022-10-12 VITALS
HEART RATE: 91 BPM | OXYGEN SATURATION: 98 % | SYSTOLIC BLOOD PRESSURE: 116 MMHG | BODY MASS INDEX: 21.92 KG/M2 | WEIGHT: 104.4 LBS | TEMPERATURE: 97.8 F | DIASTOLIC BLOOD PRESSURE: 68 MMHG | HEIGHT: 58 IN

## 2022-10-12 DIAGNOSIS — Z23 NEED FOR VACCINATION: ICD-10-CM

## 2022-10-12 DIAGNOSIS — I10 ESSENTIAL HYPERTENSION: Chronic | ICD-10-CM

## 2022-10-12 DIAGNOSIS — E87.1 HYPONATREMIA: ICD-10-CM

## 2022-10-12 DIAGNOSIS — E11.29 CONTROLLED TYPE 2 DIABETES MELLITUS WITH MICROALBUMINURIA, WITHOUT LONG-TERM CURRENT USE OF INSULIN (HCC): ICD-10-CM

## 2022-10-12 DIAGNOSIS — R80.9 CONTROLLED TYPE 2 DIABETES MELLITUS WITH MICROALBUMINURIA, WITHOUT LONG-TERM CURRENT USE OF INSULIN (HCC): ICD-10-CM
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
## 2022-10-12 DIAGNOSIS — M81.0 AGE-RELATED OSTEOPOROSIS WITHOUT CURRENT PATHOLOGICAL FRACTURE: ICD-10-CM

## 2022-10-12 PROCEDURE — G0008 ADMIN INFLUENZA VIRUS VAC: HCPCS | Performed by: STUDENT IN AN ORGANIZED HEALTH CARE EDUCATION/TRAINING PROGRAM

## 2022-10-12 PROCEDURE — 90662 IIV NO PRSV INCREASED AG IM: CPT | Performed by: STUDENT IN AN ORGANIZED HEALTH CARE EDUCATION/TRAINING PROGRAM

## 2022-10-12 PROCEDURE — 99214 OFFICE O/P EST MOD 30 MIN: CPT | Mod: 25,GC | Performed by: STUDENT IN AN ORGANIZED HEALTH CARE EDUCATION/TRAINING PROGRAM

## 2022-10-12 RX ORDER — CARVEDILOL 25 MG/1
25 TABLET ORAL 2 TIMES DAILY
Qty: 180 TABLET | Refills: 2 | COMMUNITY
Start: 2022-10-12 | End: 2023-07-26 | Stop reason: SDUPTHER

## 2022-10-12 NOTE — PROGRESS NOTES
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    #hyponatremia  132 baseline 135-140  No seizure. No dizziness. No n/v.    #fall  Fell 3 weeks ago. No presyncopal or syncopal symptom during the episode. She tripped while working at yard. She hit her head and her left rib. Left rib has been improving. No pain elicited on deep inspiration. No fall since then.    #heartburn   well controlled on famoitidine and tums    #diabetes  A1c well controlled on diet and exercise. Continue diabetic diet and exercise  Patient does not want to be on medication.    Lab review:  132 baseline 135-140  LDL 95  a1c 6.2, glucose 95  Microalbumin 164    No smoking, alcohol, drugs. Lives at home with daugther.    Health maintenance:  -dexa scan due 2023  -Last mammogram 2019 normal;screening discontinued after discussion with patient given her age and limited benefit.      Patient Active Problem List    Diagnosis Date Noted    Numbness of extremity 07/21/2021    CAD (coronary artery disease) 01/15/2021    Cardiac pacemaker 01/15/2021    Dry eyes 09/10/2020    Generalized anxiety disorder 09/10/2020    Personal history of tuberculosis 10/23/2019    Scoliosis 11/19/2018    Seasonal allergic rhinitis due to pollen 05/10/2018    Lentigo 03/06/2018    Age-related osteoporosis without current pathological fracture 11/15/2017    Controlled type 2 diabetes mellitus with microalbuminuria, without long-term current use of insulin ([facility]) 04/26/2016    Gastroesophageal reflux disease 11/07/2014    Aortic valve sclerosis 07/10/2014    Degeneration of intervertebral disc of cervical  "region 07/10/2014    Dyslipidemia 07/10/2014    Vitiligo 07/10/2014    Insomnia 07/10/2014    Vitamin d deficiency 10/18/2012    Family history of coronary artery disease 04/05/2012    Hyperlipidemia 09/21/2011    Essential hypertension 09/21/2011    Abnormal electrocardiogram 05/24/2010    History of breast cancer 04/08/2009       Current Outpatient Medications   Medication Sig Dispense Refill    carvedilol (COREG) 25 MG Tab Take 1 Tablet by mouth 2 times a day. 180 Tablet 2    famotidine (PEPCID) 20 MG Tab TAKE 1 TABLET TWICE DAILY 180 Tablet 2    rosuvastatin (CRESTOR) 20 MG Tab TAKE 1 TABLET EVERY EVENING 90 Tablet 2    fluticasone (FLONASE) 50 MCG/ACT nasal spray USE 1 SPRAY IN EACH NOSTRIL EVERY DAY 16 g 2    lisinopril (PRINIVIL) 40 MG tablet Take 1 Tablet by mouth every day. 90 Tablet 2    ascorbic acid (VITAMIN C) 500 MG tablet Take 500 mg by mouth every day.      FISH OIL by Does not apply route.        aspirin 81 MG tablet Take  by mouth every day.      Cholecalciferol (VITAMIN D) 2000 UNIT TABS Take  by mouth. Take 1 tablet daily.        No current facility-administered medications for this visit.       ROS:   See HPI    /68 (BP Location: Right arm, Patient Position: Sitting, BP Cuff Size: Adult)   Pulse 91   Temp 36.6 °C (97.8 °F) (Temporal)   Ht 1.473 m (4' 10\")   Wt 47.4 kg (104 lb 6.4 oz)   SpO2 98%   BMI 21.82 kg/m²     Physical Exam   Constitutional:  oriented to person, place, and time. No distress.   Eyes: No scleral icterus.  Neck: Neck supple. No thyromegaly present.   Cardiovascular: Normal rate, regular rhythm and normal heart sounds.  Exam reveals no gallop and no friction rub.  No murmur heard.  Pulmonary/Chest: Breath sounds normal.   Musculoskeletal:  left rib no bruising or tenderness to palpation. no edema. Osteoarthritic changes noted on fingers.  Lymphadenopathy: no cervical adenopathy  Neurological: alert and oriented to person, place, and time.   Skin: No cyanosis. Nails " show no clubbing.      Assessment and Plan    #Hyponatremia  Asymptomatic. Mild. Follow up in 3 months.    Plan:  - Basic Metabolic Panel; Future    #Fall, subsequent encounter  Mechanical fall. Fell 3 weeks ago. No presyncopal or syncopal symptom during the episode. She tripped while working at yard. She hit her head and her left rib. No pain elicited on deep inspiration. No fall since then.    Plan:  Tylenol prn  Encouraged taking deep breath    #Controlled type 2 diabetes mellitus with microalbuminuria, without long-term current use of insulin ([facility])  A1c well controlled on diet and exercise. Continue diabetic diet and exercise  Patient does not want to be on medication.      #HTN  Blood pressure looks good today and at home systolic 120s.  Blood pressure measured at home. Looks good mostly in 110s-120s at home, occasionally in 150s and 90s. BP in clinic today is 151/76. Due to fluctuating blood pressures at home and risks for falls, will hold off on increasing her antihypertensives for now.    Plan:  -continue lisinopril 40 mg daily  -continue carvedilol 25 mg BID.       #dyslipidemia  Eating healthy LDL trending down.    Plan:  -Continue current dose of crestor 20 mg daily, holding off on increasing the dose due to fall risk, myopathy as well as limited benefit given her age and current LDL level.      #GERD  Famotidine 20 mg BID  Tums as needed      #screening for osteoporosis   DEXA from 2008 to 2015 showed decrease in bone density, started on reclast infusion. .Bisphosphonate has been discontinued about 5 years ago. Continuing reclast infusion. Last reclast infusion was last June 2021.  Takes vitamin d and calcium.    dexa 2021: major osteoportic 10.5%, Hip fracture 3.3%  dexa 2017: Major Osteoporotic     7.6% , Hip     1.8%     Plan:  Continue reclast influsion, due in June 2022  Continue vitamin d  Repeat dexa scan    #aortic sclerosis  #sinus bradycardia  Sees cardiologist at [facility] every 6  months. History of aortic sclerosis and bradycardia s/p pacemaker placement 2013. Planning pacemaker replacement.      #personal history of breast cancer  s/p lumpectomy and radiation 2009, mammograms normal since then. last mammogram 2019. there is no clear benefit to continuing annual mammogramy screening in women over age 75.     Followup: Return in about 3 months (around 1/12/2023).      Signed by: Chino Diana M.D.

## 2022-10-12 NOTE — PATIENT INSTRUCTIONS
Do labs 1 week before next visit.   Please get DEXA scan done.  Gave flushot.  Continue taking deep breath.

## 2022-10-13 RX ORDER — LISINOPRIL 40 MG/1
TABLET ORAL
Qty: 90 TABLET | Refills: 2 | Status: SHIPPED | OUTPATIENT
Start: 2022-10-13 | End: 2023-08-02 | Stop reason: SDUPTHER

## 2022-10-13 NOTE — TELEPHONE ENCOUNTER
Last seen: 10.12.22 by Dr. Diana  Next appt: 01.13.22 with Dr. Diana    Was the patient seen in the last year in this department? Yes   Does patient have an active prescription for medications requested? No   Received Request Via: Pharmacy

## 2022-11-03 ENCOUNTER — PATIENT MESSAGE (OUTPATIENT)
Dept: HEALTH INFORMATION MANAGEMENT | Facility: OTHER | Age: 82
End: 2022-11-03

## 2022-11-18 NOTE — ASSESSMENT & PLAN NOTE
/62  Chronic condition managed with current medical regimen  Stable per review   Continue with current meds  Followed by Luz Marina Figueroa M.D..       [employer] Pregnancy And Lactation Text: This medication is Pregnancy Category B and is considered safe. There is some data to suggest avoiding during the third trimester. It is unknown if this medication is excreted in breast milk.

## 2022-12-04 DIAGNOSIS — J30.1 SEASONAL ALLERGIC RHINITIS DUE TO POLLEN: ICD-10-CM

## 2022-12-05 NOTE — TELEPHONE ENCOUNTER
Received request via: Pharmacy    Was the patient seen in the last year in this department? Yes     Does the patient have an active prescription (recently filled or refills available) for medication(s) requested? No    Does the patient have half-way Plus and need 100 day supply (blood pressure, diabetes and cholesterol meds only)? Patient does not have SCP

## 2022-12-06 RX ORDER — FLUTICASONE PROPIONATE 50 MCG
SPRAY, SUSPENSION (ML) NASAL
Qty: 32 G | Refills: 3 | Status: SHIPPED | OUTPATIENT
Start: 2022-12-06 | End: 2023-12-07

## 2022-12-07 ENCOUNTER — OFFICE VISIT (OUTPATIENT)
Dept: INTERNAL MEDICINE | Facility: OTHER | Age: 82
End: 2022-12-07
Payer: MEDICARE

## 2022-12-07 VITALS
OXYGEN SATURATION: 96 % | HEIGHT: 55 IN | BODY MASS INDEX: 24.16 KG/M2 | TEMPERATURE: 97.4 F | HEART RATE: 87 BPM | DIASTOLIC BLOOD PRESSURE: 76 MMHG | SYSTOLIC BLOOD PRESSURE: 153 MMHG | WEIGHT: 104.4 LBS

## 2022-12-07 DIAGNOSIS — R05.8 OTHER COUGH: ICD-10-CM

## 2022-12-07 DIAGNOSIS — J06.9 VIRAL UPPER RESPIRATORY TRACT INFECTION: ICD-10-CM

## 2022-12-07 PROCEDURE — 99213 OFFICE O/P EST LOW 20 MIN: CPT | Performed by: INTERNAL MEDICINE

## 2022-12-07 RX ORDER — GUAIFENESIN AND DEXTROMETHORPHAN HYDROBROMIDE 100; 10 MG/5ML; MG/5ML
5 SOLUTION ORAL EVERY 6 HOURS PRN
Qty: 118 ML | Refills: 0 | Status: SHIPPED | OUTPATIENT
Start: 2022-12-07 | End: 2023-05-19

## 2022-12-07 NOTE — PATIENT INSTRUCTIONS
I recommend that you continue the MUCINEX DM. You may also use cough drops or honey, steam inhalation, adequate hydration, tyelenol as needed.   If there is worsening symptoms or development of fever or shortness of breath then seek medical help immediately.   Once you have recovered, then try to get the bivalent COVID Vaccine and also we will switch your lisinopril to losartan on the next visit after you have recovered.    Follow up as scheduled

## 2022-12-07 NOTE — PROGRESS NOTES
Established Patient    Chief Complaint   Patient presents with    Cough     1x week        HPI: Leah Cortez is a 82 y.o. female with significant past medical history of coronary artery disease, bradycardia status post pacemaker history of heart failure and other past medical history as below who presented to the clinic for the following.    #URI  -Started on Tuesday 8 days ago.  -Symptoms of rhinorrhea and dry cough not associated with diarrhea, body aches, sore throat, ear pain, shortness of breath, fevers.  On Saturday she felt more congested and heard raspy voice, no wheeze. Started Mucinex DM after which she has been getting better. Not tested for COVID. No sick contact. Has received 3 doses of COVID vaccine including first booster. Never had COVID.   -Today on exam, symmetrical clear breath sounds heard with no added sounds or crackles.   Incidentally patient also reports that she has cough issues whenever she takes lisinopril. When she skips a dose she feels better. BP : SBP 150s in clinic today       Patient Active Problem List    Diagnosis Date Noted    Numbness of extremity 07/21/2021    CAD (coronary artery disease) 01/15/2021    Cardiac pacemaker 01/15/2021    Dry eyes 09/10/2020    Generalized anxiety disorder 09/10/2020    Personal history of tuberculosis 10/23/2019    Scoliosis 11/19/2018    Seasonal allergic rhinitis due to pollen 05/10/2018    Lentigo 03/06/2018    Age-related osteoporosis without current pathological fracture 11/15/2017    Controlled type 2 diabetes mellitus with microalbuminuria, without long-term current use of insulin (HCC) 04/26/2016    Gastroesophageal reflux disease 11/07/2014    Aortic valve sclerosis 07/10/2014    Degeneration of intervertebral disc of cervical region 07/10/2014    Dyslipidemia 07/10/2014    Vitiligo 07/10/2014    Insomnia 07/10/2014    Vitamin d deficiency 10/18/2012    Family history of coronary artery disease 04/05/2012    Hyperlipidemia  "09/21/2011    Essential hypertension 09/21/2011    Abnormal electrocardiogram 05/24/2010    History of breast cancer 04/08/2009       Current Outpatient Medications   Medication Sig Dispense Refill    Dextromethorphan-guaiFENesin (TUSSIN DM)  MG/5ML Syrup Take 5 mL by mouth every 6 hours as needed (for cough). 118 mL 0    fluticasone (FLONASE) 50 MCG/ACT nasal spray USE 1 SPRAY IN EACH NOSTRIL EVERY DAY 32 g 3    lisinopril (PRINIVIL) 40 MG tablet TAKE 1 TABLET EVERY DAY 90 Tablet 2    carvedilol (COREG) 25 MG Tab Take 1 Tablet by mouth 2 times a day. 180 Tablet 2    famotidine (PEPCID) 20 MG Tab TAKE 1 TABLET TWICE DAILY 180 Tablet 2    rosuvastatin (CRESTOR) 20 MG Tab TAKE 1 TABLET EVERY EVENING 90 Tablet 2    ascorbic acid (VITAMIN C) 500 MG tablet Take 500 mg by mouth every day.      FISH OIL by Does not apply route.        aspirin 81 MG tablet Take  by mouth every day.      Cholecalciferol (VITAMIN D) 2000 UNIT TABS Take  by mouth. Take 1 tablet daily.        No current facility-administered medications for this visit.       ROS: As per HPI. Additional pertinent systems as noted below.  Constitutional:  Negative for fever, chills   HENT:  Negative for ear pain, sore throat,  Eyes:  Negative for blurred vision and double vision   Cardiovascular:  Negative for chest pain, palpitations   Respiratory:  Negative for shortness of breath   Gastrointestinal: Negative for abdominal pain, nausea, vomiting, diarrhea, or blood in stools       BP (!) 153/76 (BP Location: Right arm, Patient Position: Sitting, BP Cuff Size: Adult)   Pulse 87   Temp 36.3 °C (97.4 °F) (Temporal)   Ht 1.397 m (4' 7\")   Wt 47.4 kg (104 lb 6.4 oz)   SpO2 96%   BMI 24.26 kg/m²     Physical Exam   Constitutional:  Well developed, well nourished. Not in acute distress   HENT:  Normocephalic, Atraumatic, Oropharynx is pink and moist. No oral exudates, Nose erythematous  Eyes:  EOMI, Conjunctiva normal, No discharge. PERRLA   Neck:  Normal " range of motion, No cervical lymphadenopathy.   Cardiovascular:  Regular rate and rhythm, No pedal edema, Intact distal pulses   Respiratory: Clear to auscultation bilaterally, No use of accessory muscles        Note: I have reviewed all pertinent labs and diagnostic tests associated with this visit with specific comments listed under the assessment and plan below    Assessment and Plan    1. Viral upper respiratory tract infection  In Convalescent phase.  Better with Mucinex DM.   Prefers syrup, placing prescription.   Additionally recommended adequate hydration, cough drops, tylenol as needed, steam inhalation.   Recommend bivalent COVID vaccine after recovery  Instructed patient to seek medical attention immediately, if developing shortness of breath , hypoxia, fever or worsening symptoms.     2. Other cough  Reports that she has cough whenever she takes lisinopril.   After recovery from current illness if cough persists then consider switching to equivalent dose of losartan.       Followup: Return in about 5 weeks (around 1/11/2023).        Signed by: Srini Dan M.D.

## 2023-01-13 ENCOUNTER — OFFICE VISIT (OUTPATIENT)
Dept: INTERNAL MEDICINE | Facility: OTHER | Age: 83
End: 2023-01-13
Payer: MEDICARE

## 2023-01-13 VITALS
SYSTOLIC BLOOD PRESSURE: 128 MMHG | DIASTOLIC BLOOD PRESSURE: 70 MMHG | WEIGHT: 103.6 LBS | HEART RATE: 84 BPM | BODY MASS INDEX: 23.97 KG/M2 | HEIGHT: 55 IN | OXYGEN SATURATION: 98 % | TEMPERATURE: 97 F

## 2023-01-13 DIAGNOSIS — R80.9 CONTROLLED TYPE 2 DIABETES MELLITUS WITH MICROALBUMINURIA, WITHOUT LONG-TERM CURRENT USE OF INSULIN (HCC): ICD-10-CM

## 2023-01-13 DIAGNOSIS — M81.0 AGE-RELATED OSTEOPOROSIS WITHOUT CURRENT PATHOLOGICAL FRACTURE: ICD-10-CM

## 2023-01-13 DIAGNOSIS — K21.9 GASTROESOPHAGEAL REFLUX DISEASE, UNSPECIFIED WHETHER ESOPHAGITIS PRESENT: ICD-10-CM

## 2023-01-13 DIAGNOSIS — E11.29 CONTROLLED TYPE 2 DIABETES MELLITUS WITH MICROALBUMINURIA, WITHOUT LONG-TERM CURRENT USE OF INSULIN (HCC): ICD-10-CM

## 2023-01-13 DIAGNOSIS — I10 ESSENTIAL HYPERTENSION: Chronic | ICD-10-CM

## 2023-01-13 DIAGNOSIS — E78.2 MIXED HYPERLIPIDEMIA: Chronic | ICD-10-CM

## 2023-01-13 DIAGNOSIS — E78.5 DYSLIPIDEMIA: ICD-10-CM

## 2023-01-13 DIAGNOSIS — R80.9 MICROALBUMINURIA: ICD-10-CM

## 2023-01-13 PROCEDURE — 99214 OFFICE O/P EST MOD 30 MIN: CPT | Mod: GC | Performed by: STUDENT IN AN ORGANIZED HEALTH CARE EDUCATION/TRAINING PROGRAM

## 2023-01-13 ASSESSMENT — PATIENT HEALTH QUESTIONNAIRE - PHQ9: CLINICAL INTERPRETATION OF PHQ2 SCORE: 0

## 2023-01-13 NOTE — PROGRESS NOTES
Established Patient    Leah presents today with the following:    CC: lab follow up      HPI:   This is 82 yo female with a history of bradycardia s/p pacemaker placement 6/2013,HTN, HLD, breast carcinoma in situ (s/p lumpectomy and radiation 2009, mammograms normal since then), osteoporosis on reclast, who is presenting to our clinic for follow up of chronic medical problems.    Hyponatremia resolved.    uses saline and then use fluticasone. Sneezes.   No fever, chills ,cough.  Requesting oral medication for seasonal allergy.      No fall.    Denies chest pain, palpitation, shortness of breath, lightheadedness, blood in stools, melena, weakness, paresthesia, change in vision. Denies facial droop, paresthesia    No smoking, alcohol, drugs. Lives at home with daugther.    Health maintenance:  -dexa scan due 2023  -Last mammogram 2019 normal;screening discontinued after discussion with patient given her age and limited benefit.      Patient Active Problem List    Diagnosis Date Noted    Microalbuminuria 01/13/2023    CAD (coronary artery disease) 01/15/2021    Cardiac pacemaker 01/15/2021    Personal history of tuberculosis 10/23/2019    Scoliosis 11/19/2018    Seasonal allergic rhinitis due to pollen 05/10/2018    Age-related osteoporosis without current pathological fracture 11/15/2017    Controlled type 2 diabetes mellitus with microalbuminuria, without long-term current use of insulin ([facility]) 04/26/2016    Gastroesophageal reflux disease 11/07/2014    Aortic valve sclerosis 07/10/2014    Degeneration of intervertebral disc of cervical region 07/10/2014    Dyslipidemia 07/10/2014    Insomnia 07/10/2014    Vitamin d deficiency 10/18/2012    Family history of coronary artery disease 04/05/2012    Hyperlipidemia 09/21/2011    Essential hypertension 09/21/2011    Abnormal electrocardiogram 05/24/2010    History of breast cancer 04/08/2009       Current Outpatient Medications   Medication Sig Dispense Refill  "   Dextromethorphan-guaiFENesin (TUSSIN DM)  MG/5ML Syrup Take 5 mL by mouth every 6 hours as needed (for cough). 118 mL 0    fluticasone (FLONASE) 50 MCG/ACT nasal spray USE 1 SPRAY IN EACH NOSTRIL EVERY DAY 32 g 3    lisinopril (PRINIVIL) 40 MG tablet TAKE 1 TABLET EVERY DAY 90 Tablet 2    carvedilol (COREG) 25 MG Tab Take 1 Tablet by mouth 2 times a day. 180 Tablet 2    famotidine (PEPCID) 20 MG Tab TAKE 1 TABLET TWICE DAILY 180 Tablet 2    rosuvastatin (CRESTOR) 20 MG Tab TAKE 1 TABLET EVERY EVENING 90 Tablet 2    ascorbic acid (VITAMIN C) 500 MG tablet Take 500 mg by mouth every day.      FISH OIL by Does not apply route.        aspirin 81 MG tablet Take  by mouth every day.      Cholecalciferol (VITAMIN D) 2000 UNIT TABS Take  by mouth. Take 1 tablet daily.        No current facility-administered medications for this visit.       ROS:   See HPI    /70 (BP Location: Right arm, Patient Position: Sitting, BP Cuff Size: Adult)   Pulse 84   Temp 36.1 °C (97 °F) (Temporal)   Ht 1.397 m (4' 7\")   Wt 47 kg (103 lb 9.6 oz)   SpO2 98%   BMI 24.08 kg/m²     Physical Exam   Constitutional:  oriented to person, place, and time. No distress.   Eyes: No scleral icterus.  Neck: Neck supple. No thyromegaly present.   Cardiovascular: Normal rate, regular rhythm and normal heart sounds.  Exam reveals no gallop and no friction rub.  No murmur heard.  Pulmonary/Chest: Breath sounds normal.   Musculoskeletal:  no edema. Osteoarthritic changes noted on fingers. Has an extra digit on left thumb.   Lymphadenopathy: no cervical adenopathy  Neurological: alert and oriented to person, place, and time.   Skin: No cyanosis. Nails show no clubbing.      Assessment and Plan    #Controlled type 2 diabetes mellitus with microalbuminuria, without long-term current use of insulin   A1c well controlled on diet and exercise. Continue diabetic diet and exercise  Patient does not want to be on medication.  Continuing lisinopril at " current dosage    #Hyponatremia  resolved        #HTN  Well controlled on lisinopril and carvedilol  -continue lisinopril 40 mg daily  -continue carvedilol 25 mg BID.       #dyslipidemia  Eating healthy LDL trending down.    Plan:  -Continue current dose of crestor 20 mg daily, holding off on increasing the dose due to fall risk, myopathy as well as limited benefit given her age and current LDL level.      #GERD  -well controlled. Chronic for many years.  Famotidine 20 mg BID  Tums as needed      #screening for osteoporosis   DEXA from 2008 to 2015 showed decrease in bone density, started on reclast infusion. .Bisphosphonate has been discontinued about 5 years ago. Continuing reclast infusion. Last reclast infusion was last June 2021.  Takes vitamin d and calcium.    dexa 2021: major osteoportic 10.5%, Hip fracture 3.3%  dexa 2017: Major Osteoporotic     7.6% , Hip     1.8%     Plan:  Continue reclast influsion, started 2019, maximum drug holiday 2024 (after dexa scan that is due 2023)    #aortic sclerosis  #sinus bradycardia  S/P pacemaker placement. Switching battery this year.  Sees cardiologist at [facility] every 6 months. History of aortic sclerosis and bradycardia s/p pacemaker placement 2013.     #seasonal allergy  -doing sinus rinse and flonase  -Instructed patient she can try Allegra or Zyrtec if she wants to    #personal history of breast cancer  s/p lumpectomy and radiation 2009, mammograms normal since then. last mammogram 2019. there is no clear benefit to continuing annual mammogramy screening in women over age 75.     Followup: Return in about 4 months (around 5/13/2023).      Signed by: Chino Diana M.D.

## 2023-01-13 NOTE — PATIENT INSTRUCTIONS
Do labs (fasting) 1 week before next visit.   You can take allegra(fexofenadine) or zyrtec (cetirzine) for your allergy  Make sure you get tdap vaccine

## 2023-05-19 ENCOUNTER — OFFICE VISIT (OUTPATIENT)
Dept: INTERNAL MEDICINE | Facility: OTHER | Age: 83
End: 2023-05-19
Payer: MEDICARE

## 2023-05-19 VITALS
DIASTOLIC BLOOD PRESSURE: 68 MMHG | BODY MASS INDEX: 22.86 KG/M2 | HEIGHT: 55 IN | OXYGEN SATURATION: 96 % | TEMPERATURE: 97.2 F | HEART RATE: 86 BPM | WEIGHT: 98.8 LBS | SYSTOLIC BLOOD PRESSURE: 123 MMHG

## 2023-05-19 DIAGNOSIS — M81.0 AGE-RELATED OSTEOPOROSIS WITHOUT CURRENT PATHOLOGICAL FRACTURE: ICD-10-CM

## 2023-05-19 DIAGNOSIS — E11.29 CONTROLLED TYPE 2 DIABETES MELLITUS WITH MICROALBUMINURIA, WITHOUT LONG-TERM CURRENT USE OF INSULIN (HCC): ICD-10-CM

## 2023-05-19 DIAGNOSIS — R80.9 CONTROLLED TYPE 2 DIABETES MELLITUS WITH MICROALBUMINURIA, WITHOUT LONG-TERM CURRENT USE OF INSULIN (HCC): ICD-10-CM

## 2023-05-19 DIAGNOSIS — H53.8 BLURRY VISION: ICD-10-CM

## 2023-05-19 PROBLEM — I25.10 CAD (CORONARY ARTERY DISEASE): Status: RESOLVED | Noted: 2021-01-15 | Resolved: 2023-05-19

## 2023-05-19 PROCEDURE — 99213 OFFICE O/P EST LOW 20 MIN: CPT | Mod: GC | Performed by: STUDENT IN AN ORGANIZED HEALTH CARE EDUCATION/TRAINING PROGRAM

## 2023-05-19 PROCEDURE — 3074F SYST BP LT 130 MM HG: CPT | Performed by: STUDENT IN AN ORGANIZED HEALTH CARE EDUCATION/TRAINING PROGRAM

## 2023-05-19 PROCEDURE — 3078F DIAST BP <80 MM HG: CPT | Performed by: STUDENT IN AN ORGANIZED HEALTH CARE EDUCATION/TRAINING PROGRAM

## 2023-05-19 NOTE — PROGRESS NOTES
"      Office Visit Follow up    Chief Complaint   Patient presents with    Follow-Up    Diabetes       Subjective   Pt states that over the past 2 years she has been having Blurry vision (L eye > R Eye) which only occurs when she is having GERD sx. There are associated floaters. However, she denies any eye pain or visual field deficits. She states that this blurry vision improves with Famotidine. She denies any recent infections or recent travel or trauma that might have precipitated these sx.      ROS   -Endo: polyuria and polydipsia. 5lb weight loss over 1 year due to cutting down on rice consumption. Pt walks on a treadmill for 30 min per day. For breakfast, she eats whole wheat toast and mild. For lunch, she eats vegetables with either fish or chicken. For dinner, she eats Oatmeal.    /68 (BP Location: Right arm, Patient Position: Sitting, BP Cuff Size: Adult)   Pulse 86   Temp 36.2 °C (97.2 °F) (Temporal)   Ht 1.397 m (4' 7\")   Wt 44.8 kg (98 lb 12.8 oz)   SpO2 96%   BMI 22.96 kg/m²     Physical Exam   -General: NAD, converses well  -Cardio: no murmurs or gallops  -Resp: lungs CTAB, symmetric expansion  -Abd: soft and nontender, no guarding or rebound  -Skin: no foot wounds seen    Data   -Cr = 0.74 --> 1.02    -AST/ALT = wnl   -Hgb = 14.3   -LDL = 99  -Alb/Cr = 263  -Echo (2013) = grade 1 DD  -Vit D = wnl  -TSH = wnl  -RF/KAMRON = negative     Note: I have reviewed all pertinent labs and diagnostic tests associated with this visit with specific comments listed under the assessment and plan below    Assessment and Plan  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     She presented with Blurry Vision. DEXA scan for consideration of stopping Zolendronic Acid " = pending.    -----------------------------------------------------------------------------------------------------------------------------------------------------------------------------------------------------------  #Blurry Vision x2 years  (Etiology uncertain, but given pt's preDM2, this may be from either a diabetic retinopathy or lens swelling from osmotic shifts. It is strange that pt's sx only occur with GERD sx. Pt has no visual field loss or eye pain that would be suggestive of Glaucoma, CRAO or CVA)  -Optho referral = pending    #preDM2  -Alb/Cr = 263 (2023)  -next A1c is due ~11/2023  -pt has a dentist  -pt has an ophthalmologist  -Monofilament = 3/4 on R, 4/4 on the L  -pt is on a statin  -encouraged healthy diet/exercise     #Osteoporosis  (T-score = -3.4 in 2017)  -c/w Zolendronic Acid (considering drug holiday soon pending repeat DEXA scan)    #HLD  -c/w statin    #HTN  -c/w Carvedilol + Lisinopril    #GERD  -c/w Famotidine    #Allergic Rhinitis  -c/w Fluticasone nasal spray    #Vitamin D Deficiency  -c/w Ca/D supplements    #Preventative Health Care  -COVID vaccine = obtained  -next A1c = due ~November 2023  -next Lipid panel = not done due to age  -next colonoscopy = done  -next TD booster = recommended  -shingles vaccine = obtained  -PCV Vaccine = obtained  -pt is not a vegetarian  -next PAP = done  -next Mammogram = done  -DEXA scan = pending  -HIV x1 = done  -Hep C x1 = done      Code Status = unknown    Followup: 5 weeks      Sammy Ibarra, PGY2  [employer]    Pt has been seen and discussed with the Attending Physician.

## 2023-07-26 RX ORDER — CARVEDILOL 25 MG/1
25 TABLET ORAL 2 TIMES DAILY
Qty: 180 TABLET | Refills: 2 | Status: SHIPPED | OUTPATIENT
Start: 2023-07-26 | End: 2024-03-04

## 2023-07-26 RX ORDER — FAMOTIDINE 20 MG/1
20 TABLET, FILM COATED ORAL 2 TIMES DAILY
Qty: 180 TABLET | Refills: 0 | Status: SHIPPED | OUTPATIENT
Start: 2023-07-26 | End: 2023-10-10

## 2023-08-02 RX ORDER — LISINOPRIL 40 MG/1
40 TABLET ORAL
Qty: 90 TABLET | Refills: 0 | Status: SHIPPED | OUTPATIENT
Start: 2023-08-02 | End: 2023-10-16 | Stop reason: SDUPTHER

## 2023-09-13 ENCOUNTER — OFFICE VISIT (OUTPATIENT)
Dept: INTERNAL MEDICINE | Facility: OTHER | Age: 83
End: 2023-09-13
Payer: MEDICARE

## 2023-09-13 VITALS
HEIGHT: 55 IN | TEMPERATURE: 97.9 F | SYSTOLIC BLOOD PRESSURE: 136 MMHG | HEART RATE: 76 BPM | DIASTOLIC BLOOD PRESSURE: 77 MMHG | BODY MASS INDEX: 23.1 KG/M2 | OXYGEN SATURATION: 97 % | WEIGHT: 99.8 LBS

## 2023-09-13 DIAGNOSIS — R80.9 CONTROLLED TYPE 2 DIABETES MELLITUS WITH MICROALBUMINURIA, WITHOUT LONG-TERM CURRENT USE OF INSULIN (HCC): ICD-10-CM

## 2023-09-13 DIAGNOSIS — E78.5 DYSLIPIDEMIA: ICD-10-CM

## 2023-09-13 DIAGNOSIS — Z95.0 CARDIAC PACEMAKER: ICD-10-CM

## 2023-09-13 DIAGNOSIS — Z23 NEED FOR INFLUENZA VACCINATION: ICD-10-CM

## 2023-09-13 DIAGNOSIS — E11.29 CONTROLLED TYPE 2 DIABETES MELLITUS WITH MICROALBUMINURIA, WITHOUT LONG-TERM CURRENT USE OF INSULIN (HCC): ICD-10-CM

## 2023-09-13 DIAGNOSIS — Z23 ENCOUNTER FOR VACCINATION: ICD-10-CM

## 2023-09-13 DIAGNOSIS — I10 ESSENTIAL HYPERTENSION: Chronic | ICD-10-CM

## 2023-09-13 PROBLEM — H53.8 BLURRY VISION: Status: RESOLVED | Noted: 2023-05-19 | Resolved: 2023-09-13

## 2023-09-13 PROCEDURE — 3075F SYST BP GE 130 - 139MM HG: CPT | Mod: GC

## 2023-09-13 PROCEDURE — 90662 IIV NO PRSV INCREASED AG IM: CPT | Mod: GC

## 2023-09-13 PROCEDURE — G0008 ADMIN INFLUENZA VIRUS VAC: HCPCS | Mod: GC

## 2023-09-13 PROCEDURE — 99214 OFFICE O/P EST MOD 30 MIN: CPT | Mod: 25,GC

## 2023-09-13 PROCEDURE — 3078F DIAST BP <80 MM HG: CPT | Mod: GC

## 2023-09-13 NOTE — PROGRESS NOTES
Date of Service:  9/13/2023    CC: Follow up     HPI:  Leah Cortez  is a 83 y.o. female with a medical history including essential hypertension, history of breast cancer, cardiac pacemaker, scoliosis, and Type 2 diabetes treated with diet.     Patient is here for her six month follow up. She will have her pacemaker changed in January. She denies any changes in her eye sight and she follows with the optometrist for yearly check ups for her cataracts.     She has had no changes in her breathing. She has not noted any GERD symptoms. She has also not been experiencing any seasonal allergies. She denies any chest pain or palpations. Her insomnia has been doing better. She has been going to bed early and has been waking up early as well.     She has a history of precancerous breast lesion. It was ductal in nature and was removed. She is not interested in genetic screening, but thinks her daughter should get it.     Review of systems:  See H&P    Past Medical History:  Patient Active Problem List    Diagnosis Date Noted    Encounter for vaccination 09/15/2023    Microalbuminuria 01/13/2023    Cardiac pacemaker 01/15/2021    Personal history of tuberculosis 10/23/2019    Scoliosis 11/19/2018    Seasonal allergic rhinitis due to pollen 05/10/2018    Age-related osteoporosis without current pathological fracture 11/15/2017    Controlled type 2 diabetes mellitus with microalbuminuria, without long-term current use of insulin ([facility]) 04/26/2016    Gastroesophageal reflux disease 11/07/2014    Aortic valve sclerosis 07/10/2014    Degeneration of intervertebral disc of cervical region 07/10/2014    Dyslipidemia 07/10/2014    Insomnia 07/10/2014    Vitamin d deficiency 10/18/2012    Essential hypertension 09/21/2011    Abnormal electrocardiogram 05/24/2010    History of breast cancer 04/08/2009       Past Surgical History:    has a past surgical history that includes other; hysterectomy, total abdominal; cyst excision; and  other.    Medications:  Current Outpatient Medications   Medication Sig Dispense Refill    lisinopril (PRINIVIL) 40 MG tablet Take 1 Tablet by mouth every day. 90 Tablet 0    carvedilol (COREG) 25 MG Tab Take 1 Tablet by mouth 2 times a day. 180 Tablet 2    famotidine (PEPCID) 20 MG Tab Take 1 Tablet by mouth 2 times a day. 180 Tablet 0    fluticasone (FLONASE) 50 MCG/ACT nasal spray USE 1 SPRAY IN EACH NOSTRIL EVERY DAY 32 g 3    rosuvastatin (CRESTOR) 20 MG Tab TAKE 1 TABLET EVERY EVENING 90 Tablet 2    ascorbic acid (VITAMIN C) 500 MG tablet Take 500 mg by mouth every day.      FISH OIL by Does not apply route.        Cholecalciferol (VITAMIN D) 2000 UNIT TABS Take  by mouth. Take 1 tablet daily.        No current facility-administered medications for this visit.       Allergies:  No Known Allergies    Family History:   family history includes Arthritis in her mother; Breast Cancer in her sister; Stroke in her father.     Social History:    Social History     Tobacco Use    Smoking status: Never    Smokeless tobacco: Never   Vaping Use    Vaping Use: Never used   Substance Use Topics    Alcohol use: No     Alcohol/week: 0.0 oz    Drug use: No         Physical Exam:  Vitals:    09/13/23 1049   BP: 136/77   Pulse: 76   Temp: 36.6 °C (97.9 °F)   SpO2: 97%     Body mass index is 23.2 kg/m².  Physical Exam  Constitutional:       Appearance: She is normal weight.   HENT:      Head: Normocephalic and atraumatic.   Eyes:      Extraocular Movements: Extraocular movements intact.      Pupils: Pupils are equal, round, and reactive to light.   Cardiovascular:      Rate and Rhythm: Normal rate and regular rhythm.      Pulses: Normal pulses.      Heart sounds: No murmur heard.     No friction rub. No gallop.   Pulmonary:      Effort: Pulmonary effort is normal. No respiratory distress.      Breath sounds: Normal breath sounds. No wheezing.   Abdominal:      General: Abdomen is flat. Bowel sounds are normal. There is no  distension.      Tenderness: There is no abdominal tenderness.   Musculoskeletal:         General: Normal range of motion.      Right lower leg: No edema.      Left lower leg: No edema.   Neurological:      General: No focal deficit present.      Mental Status: She is alert and oriented to person, place, and time.          Labs:  none    Imaging:  none    Assessment/Plan:  Encounter for vaccination  Patient underwent vaccination for Tetanus and influenza.     Controlled type 2 diabetes mellitus with microalbuminuria, without long-term current use of insulin ([facility])  Monofilament exam, showed decreased sensation. Patient has not been on any medication. HA1c 6.2 last visit.     Plan:  -HA1c  -Micro/albumin ratio  -CMP  -lipid profile    Cardiac pacemaker  Will undergo replacement in January.            All imaging results and lab results and consult notes are reviewed at this visit.  Followup: Return in about 6 months (around 3/13/2024) for Medicare Annual wellness.    Caroline Manriquez MD  Internal Medicine PGY-3

## 2023-09-15 PROBLEM — Z23 ENCOUNTER FOR VACCINATION: Status: ACTIVE | Noted: 2023-09-15

## 2023-09-16 NOTE — ASSESSMENT & PLAN NOTE
Monofilament exam, showed decreased sensation. Patient has not been on any medication. HA1c 6.2 last visit.     Plan:  -HA1c  -Micro/albumin ratio  -CMP  -lipid profile

## 2023-10-10 RX ORDER — FAMOTIDINE 20 MG/1
20 TABLET, FILM COATED ORAL 2 TIMES DAILY
Qty: 180 TABLET | Refills: 1 | Status: SHIPPED | OUTPATIENT
Start: 2023-10-10

## 2023-10-16 DIAGNOSIS — I10 ESSENTIAL HYPERTENSION: ICD-10-CM

## 2023-10-16 RX ORDER — LISINOPRIL 40 MG/1
40 TABLET ORAL
Qty: 90 TABLET | Refills: 3 | Status: SHIPPED | OUTPATIENT
Start: 2023-10-16

## 2023-10-16 RX ORDER — ROSUVASTATIN CALCIUM 20 MG/1
TABLET, COATED ORAL
Qty: 90 TABLET | Refills: 3 | Status: SHIPPED | OUTPATIENT
Start: 2023-10-16

## 2023-12-07 DIAGNOSIS — J30.1 SEASONAL ALLERGIC RHINITIS DUE TO POLLEN: ICD-10-CM

## 2023-12-07 RX ORDER — FLUTICASONE PROPIONATE 50 MCG
SPRAY, SUSPENSION (ML) NASAL
Qty: 32 G | Refills: 3 | Status: SHIPPED | OUTPATIENT
Start: 2023-12-07

## 2023-12-07 NOTE — TELEPHONE ENCOUNTER
Received request via: Pharmacy    Was the patient seen in the last year in this department? Yes    Does the patient have an active prescription (recently filled or refills available) for medication(s) requested?  yes    Does the patient have shelter Plus and need 100 day supply (blood pressure, diabetes and cholesterol meds only)? Patient does not have SCP

## 2023-12-14 ENCOUNTER — TELEPHONE (OUTPATIENT)
Dept: INTERNAL MEDICINE | Facility: OTHER | Age: 83
End: 2023-12-14
Payer: MEDICARE

## 2023-12-15 NOTE — TELEPHONE ENCOUNTER
VOICEMAIL  1. Caller Name: Leah Cortez                      Call Back Number: [phone]    2. Message: reports of having a cold for 3 weeks now. Interested in an x-ray to se if there is anything wrong. She has a child with her everyday so she wants to make sure she's ok. Requesting  To call    3. Patient approves office to leave a detailed voicemail/MyChart message: n/a

## 2024-03-04 RX ORDER — CARVEDILOL 25 MG/1
25 TABLET ORAL 2 TIMES DAILY
Qty: 180 TABLET | Refills: 3 | Status: SHIPPED | OUTPATIENT
Start: 2024-03-04

## 2024-03-04 NOTE — TELEPHONE ENCOUNTER
Received request via: Pharmacy    Was the patient seen in the last year in this department? Yes    Does the patient have an active prescription (recently filled or refills available) for medication(s) requested? No    Pharmacy Name: [facility] - [facility] [id number]     Does the patient have residential Plus and need 100 day supply (blood pressure, diabetes and cholesterol meds only)? Patient does not have SCP

## 2024-05-17 NOTE — TELEPHONE ENCOUNTER
Received request via: Pharmacy    Was the patient seen in the last year in this department? Yes    Does the patient have an active prescription (recently filled or refills available) for medication(s) requested? No    Pharmacy Name:   To be filled at: [facility] - [facility] [id number]          Does the patient have intermediate Plus and need 100 day supply (blood pressure, diabetes and cholesterol meds only)? Patient does not have SCP

## 2024-05-20 RX ORDER — FAMOTIDINE 20 MG/1
20 TABLET, FILM COATED ORAL 2 TIMES DAILY
Qty: 180 TABLET | Refills: 1 | Status: SHIPPED | OUTPATIENT
Start: 2024-05-20

## 2024-08-01 DIAGNOSIS — I10 ESSENTIAL HYPERTENSION: ICD-10-CM

## 2024-08-02 RX ORDER — LISINOPRIL 40 MG/1
40 TABLET ORAL
Qty: 90 TABLET | Refills: 3 | OUTPATIENT
Start: 2024-08-02

## 2024-08-02 NOTE — TELEPHONE ENCOUNTER
Received request via: Pharmacy    Was the patient seen in the last year in this department? Yes    Does the patient have an active prescription (recently filled or refills available) for medication(s) requested? No    Pharmacy Name:   To be filled at: [facility] - [facility] [address]          Does the patient have [facility] Plus and need 100 day supply (blood pressure, diabetes and cholesterol meds only)? Patient does not have SCP

## 2024-08-02 NOTE — TELEPHONE ENCOUNTER
Called patient and left detailed vm on machine.  Patient has [employer] Advantage and is no longer contracted with [facility].  Let patient know that if insurance has updated and give us a call to get scheduled.

## 2024-09-25 DIAGNOSIS — J30.1 SEASONAL ALLERGIC RHINITIS DUE TO POLLEN: ICD-10-CM

## 2024-09-25 RX ORDER — FLUTICASONE PROPIONATE 50 MCG
SPRAY, SUSPENSION (ML) NASAL
Qty: 16 G | Refills: 0 | Status: SHIPPED | OUTPATIENT
Start: 2024-09-25

## 2024-09-25 NOTE — TELEPHONE ENCOUNTER
Received request via: Pharmacy We no longer take patient insurance    Was the patient seen in the last year in this department? Yes    Does the patient have an active prescription (recently filled or refills available) for medication(s) requested?  yes    Pharmacy Name: [facility]    Does the patient have alf Plus and need 100-day supply? (This applies to ALL medications) Patient does not have SCP

## 2024-10-14 RX ORDER — ROSUVASTATIN CALCIUM 20 MG/1
TABLET, COATED ORAL
Qty: 90 TABLET | Refills: 0 | Status: SHIPPED | OUTPATIENT
Start: 2024-10-14

## 2024-10-15 RX ORDER — FAMOTIDINE 20 MG/1
20 TABLET, FILM COATED ORAL 2 TIMES DAILY
Qty: 60 TABLET | Refills: 0 | Status: SHIPPED | OUTPATIENT
Start: 2024-10-15

## 2024-12-30 RX ORDER — ROSUVASTATIN CALCIUM 20 MG/1
TABLET, COATED ORAL
Qty: 30 TABLET | Refills: 0 | Status: SHIPPED | OUTPATIENT
Start: 2024-12-30

## 2024-12-30 NOTE — TELEPHONE ENCOUNTER
Received request via: Pharmacy    Was the patient seen in the last year in this department? No, last seen 9/2023    Does the patient have an active prescription (recently filled or refills available) for medication(s) requested? No    Pharmacy Name: [facility]    Does the patient have [facility] Plus and need 100-day supply? (This applies to ALL medications) Patient does not have SCP

## 2025-01-07 ENCOUNTER — TELEPHONE (OUTPATIENT)
Dept: INTERNAL MEDICINE | Facility: OTHER | Age: 85
End: 2025-01-07
Payer: MEDICARE

## 2025-01-07 NOTE — TELEPHONE ENCOUNTER
We received a refill request for the Carvedilol.  Patient last seen in September of 2023.  , can you try calling the patient to get her in for an appointment?  Thanks.

## 2025-01-08 NOTE — TELEPHONE ENCOUNTER
LVM for patient. They have an insurance on their chart that we no longer accept. I asked them to call back to clarify if she has a new PCP

## 2025-01-09 NOTE — TELEPHONE ENCOUNTER
2nd message left for patient.  They have an insurance on their chart that we no longer accept. I asked them to call back to clarify if she has a new PCP